# Patient Record
Sex: FEMALE | Race: WHITE
[De-identification: names, ages, dates, MRNs, and addresses within clinical notes are randomized per-mention and may not be internally consistent; named-entity substitution may affect disease eponyms.]

---

## 2017-07-10 ENCOUNTER — HOSPITAL ENCOUNTER (INPATIENT)
Dept: HOSPITAL 74 - JER | Age: 52
LOS: 7 days | Discharge: HOME | DRG: 392 | End: 2017-07-17
Attending: FAMILY MEDICINE | Admitting: FAMILY MEDICINE
Payer: COMMERCIAL

## 2017-07-10 VITALS — BODY MASS INDEX: 37.8 KG/M2

## 2017-07-10 DIAGNOSIS — I34.1: ICD-10-CM

## 2017-07-10 DIAGNOSIS — D64.9: ICD-10-CM

## 2017-07-10 DIAGNOSIS — K57.20: Primary | ICD-10-CM

## 2017-07-10 DIAGNOSIS — I10: ICD-10-CM

## 2017-07-10 DIAGNOSIS — E06.3: ICD-10-CM

## 2017-07-10 DIAGNOSIS — E03.9: ICD-10-CM

## 2017-07-10 DIAGNOSIS — K52.9: ICD-10-CM

## 2017-07-10 DIAGNOSIS — Z71.3: ICD-10-CM

## 2017-07-10 DIAGNOSIS — E66.9: ICD-10-CM

## 2017-07-10 LAB
ALBUMIN SERPL-MCNC: 3.5 G/DL (ref 3.4–5)
ALP SERPL-CCNC: 158 U/L (ref 45–117)
ALT SERPL-CCNC: 53 U/L (ref 12–78)
ANION GAP SERPL CALC-SCNC: 7 MMOL/L (ref 8–16)
AST SERPL-CCNC: 23 U/L (ref 15–37)
BASOPHILS # BLD: 0.3 % (ref 0–2)
BILIRUB SERPL-MCNC: 0.3 MG/DL (ref 0.2–1)
CALCIUM SERPL-MCNC: 9.3 MG/DL (ref 8.5–10.1)
CO2 SERPL-SCNC: 26 MMOL/L (ref 21–32)
CREAT SERPL-MCNC: 0.8 MG/DL (ref 0.55–1.02)
DEPRECATED RDW RBC AUTO: 13.6 % (ref 11.6–15.6)
EOSINOPHIL # BLD: 0.9 % (ref 0–4.5)
GLUCOSE SERPL-MCNC: 100 MG/DL (ref 74–106)
MCH RBC QN AUTO: 28.6 PG (ref 25.7–33.7)
MCHC RBC AUTO-ENTMCNC: 32.9 G/DL (ref 32–36)
MCV RBC: 86.8 FL (ref 80–96)
NEUTROPHILS # BLD: 86.2 % (ref 42.8–82.8)
PLATELET # BLD AUTO: 356 K/MM3 (ref 134–434)
PMV BLD: 7.3 FL (ref 7.5–11.1)
PROT SERPL-MCNC: 6.6 G/DL (ref 6.4–8.2)
WBC # BLD AUTO: 10.1 K/MM3 (ref 4–10)

## 2017-07-10 NOTE — PDOC
History of Present Illness





- General


History Source: Patient, Old Records


Exam Limitations: No Limitations





- History of Present Illness


Initial Comments: 





07/10/17 18:08


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 

hypothyroidim, MVP, s/p cholecystectomy who presents to the ED complaining of 

approximately 5 days of periumbilical pain. Her pain is waxing and waning, 

nonmigrating, ranked 8/10 in intensity. Her pain initially radiated to the back 

but is now localized to the periumbilical region. She states her pain subsided 

over the weekend, but today became worse, prompting her to come to the ED for 

evaluation. No nausea, vomiting, or diarrhea. Bowel movements are normal. No 

fever or chills. No chest pain or shortness of breath. 





PCP: Dr. Melvin Terry


She was scheduled for an abdominal US tomorrow morning. 





<Eryn Souza - Last Filed: 07/10/17 21:03>





<Magaly Kenney - Last Filed: 07/10/17 21:55>





- General


Chief Complaint: Pain, Acute


Stated Complaint: ABD PAIN


Time Seen by Provider: 07/10/17 17:28





Past History





<Eryn Souza - Last Filed: 07/10/17 21:03>





- Past Medical History


Cardiac Disorders: Yes (mvp)


Thyroid Disease: Yes (hypo)





- Surgical History


Cholecystectomy: Yes





- Psycho/Social/Smoking Cessation Hx


Anxiety: No


Suicidal Ideation: No


Smoking History: Never smoked


Have you smoked in the past 12 months: No


Information on smoking cessation initiated: No


Hx Alcohol Use: No


Drug/Substance Use Hx: No


Substance Use Type: None





<Magaly Kenney - Last Filed: 07/10/17 21:55>





- Past Medical History


Allergies/Adverse Reactions: 


 Allergies











Allergy/AdvReac Type Severity Reaction Status Date / Time


 


No Known Allergies Allergy   Verified 07/10/17 17:04











Home Medications: 


Ambulatory Orders





Atenolol [Tenormin -] 25 mg PO DAILY 07/10/17 


Levothyroxine [Synthroid -] 112 mcg PO DAILY 07/10/17 











**Review of Systems





- Review of Systems


Able to Perform ROS?: Yes


Comments:: 





07/10/17 18:20


GENERAL/CONSTITUTIONAL: No fever or chills. No weakness.


HEAD, EYES, EARS, NOSE AND THROAT: No change in vision. No ear pain or 

discharge. No sore throat


CARDIOVASCULAR: No chest pain or shortness of breath.


RESPIRATORY: No cough, wheezing, or hemoptysis.


GASTROINTESTINAL: +abdominal pain. No nausea, vomiting, diarrhea or 

constipation.


GENITOURINARY: No dysuria, frequency, or change in urination.


MUSCULOSKELETAL: No joint or muscle swelling or pain. No neck or back pain.


SKIN: No rash


NEUROLOGIC: No headache, vertigo, loss of consciousness, or change in strength/

sensation.


ENDOCRINE: No increased thirst. No abnormal weight change.


HEMATOLOGIC/LYMPHATIC: No anemia, easy bleeding, or history of blood clots.


ALLERGIC/IMMUNOLOGIC: No hives or skin allergy.








<Eryn Souza - Last Filed: 07/10/17 21:03>





*Physical Exam





- Vital Signs


 Last Vital Signs











Temp Pulse Resp BP Pulse Ox


 


 97.6 F   82   18   159/77   100 


 


 07/10/17 17:04  07/10/17 17:04  07/10/17 17:04  07/10/17 17:04  07/10/17 17:04














- Physical Exam


Comments: 





07/10/17 18:21


GENERAL: Awake, alert, and fully oriented, in no acute distress


HEAD: No signs of trauma


EYES: PERRLA, EOMI, sclera anicteric, conjunctiva clear


ENT: Auricles normal inspection, hearing grossly normal, nares patent, 

oropharynx clear without exudates. Moist mucosa


NECK: Normal ROM, supple, no lymphadenopathy, JVD, or masses


LUNGS: Breath sounds equal, clear to auscultation bilaterally.  No wheezes, and 

no crackles


HEART: Regular rate and rhythm, normal S1 and S2, no murmurs, rubs or gallops


ABDOMEN: +periumbilical and suprapubic tenderness. Soft, normoactive bowel 

sounds.  No guarding, no rebound.  No masses


EXTREMITIES: Normal range of motion, no edema.  No clubbing or cyanosis. No 

cords, erythema, or tenderness


NEUROLOGICAL: Cranial nerves II through XII grossly intact.  Normal speech, 

normal gait


SKIN: Warm, Dry, normal turgor, no rashes or lesions noted.











<Eryn Souza - Last Filed: 07/10/17 21:03>





- Vital Signs


 Last Vital Signs











Temp Pulse Resp BP Pulse Ox


 


 97.6 F   82   18   159/77   100 


 


 07/10/17 17:04  07/10/17 17:04  07/10/17 17:04  07/10/17 17:04  07/10/17 17:04














<Magaly Kenney - Last Filed: 07/10/17 21:55>





ED Treatment Course





- LABORATORY


CBC & Chemistry Diagram: 


 07/10/17 18:06





 07/10/17 18:06





<Eryn Souza - Last Filed: 07/10/17 21:03>





- LABORATORY


CBC & Chemistry Diagram: 


 07/10/17 18:06





 07/10/17 18:06





<Magaly Kenney - Last Filed: 07/10/17 21:55>





Medical Decision Making





- Medical Decision Making





07/10/17 21:03


Case discussed Dr. Castro, who admits for patient's PCP Dr. Terry. Agrees to 

admission and requests Dr. Stoddard for surgical consult. 





Placed call to Dr. Stoddard at [phone]. Awaiting call back. 





<Eryn Souza - Last Filed: 07/10/17 21:03>





- Medical Decision Making





07/10/17 21:47


51-year-old female presents with sudden periumbilical pain that started this 

afternoon.  No diarrhea or vomiting.  No fever or chills.


  Past medical history significant for cholecystectomy  in the remote past.


  On exam, she has periumbilical tenderness and requesting pain meds


-Reviewed and she has a mild leukocytosis





CAT scan, however, was concerning for a distended fluid-filled small bowel 

loops in the upper pelvis associated mesenteric edema


There is several small foci of air noted in the same region.  There is concern 

for acute ischemia versus a localized pneumoperitoneum.  Also alternatively, 

they were concerned about a perforated diverticulosis.  There was minimal 

sigmoid wall thickening, which could be due to acute diverticulitis





The patient was given IV Levaquin and Flagyl, lactic acid was sent





Surgical consultation with Dr. Marco Stoddard requested the patient be placed on 

nothing by mouth, fluid resuscitation





Dr. Castro was informed and requested the patient be sent to the unit.  I spoke 

with the covering NP who accepted the case.














<Magaly Kenney - Last Filed: 07/10/17 21:55>





*DC/Admit/Observation/Transfer





- Attestations


Scribe Attestion: 





07/10/17 18:24





Documentation prepared by Eryn Souza, acting as medical scribe for Magaly Kenney MD.





<Eryn Souza - Last Filed: 07/10/17 21:03>





- Discharge Dispostion


Admit: Yes





<Magaly Kenney - Last Filed: 07/10/17 21:55>


Diagnosis at time of Disposition: 


Abdominal pain


Qualifiers:


 Abdominal location: periumbilical Qualified Code(s): R10.33 - Periumbilical 

pain





- Discharge Dispostion


Condition at time of disposition: Stable





- Referrals


Referrals: 


Melvin Terry MD [Primary Care Provider] -

## 2017-07-10 NOTE — CONSULT
Consult


Consult Specialty:: Pulm/CCM


Reason for Consultation:: Acute periumbilical abd pain





- History of Present Illness


Chief Complaint: Acute abd pain


History of Present Illness: 


51yow with PMHx of HTN, hypothyroidim, mitral valve prolapse, s/p 

cholecystectomy in distant past who presented to the ED c/satya approximately 5 

days of waxing and waning periumbilical pain. As per previous note the pain 

initially radiated to the back but is now localized to the periumbilical 

region. She states her pain subsided over the weekend, but today became worse, 

prompting her to come to the ED for evaluation. She denied fever, chills, nausea

, vomiting, or diarrhea, chest pain or SOB. Had BM x1 loose non bloody today.  

Labs notable for WBC 10, alk phos 158, Lipase 65 and Lact 1.3. CT scan abd/

pelvis significant for distended fluid filled small bowel loops with associated 

mesentary edema and small foci of air c/facute ischemia versus a localized 

pneumoperitoneum. She was started on flagyl. Surgery Dr Stoddard was consulted. She 

was transferred to ICU for further management.





Rec'd in ICU A+O x3 T 99, /76, HR 91, O2 sat 99% on room air with c/o 8/

10 periumbilical pain which radiates laterally. Abd soft with some guarding. no 

rebound tenderness. No periumbilical discoloration noted. Kept NPO with NS IVF. 





- History Source


History Provided By: Patient


Limitations to Obtaining History: No Limitations





- Past Medical History


Cardio/Vascular: Yes: Other (Mitral valve prolapse)


...LMP Comment: about 1 year ago


...Pregnant: No (menopause)


Endocrine: Yes: Hypothyroidism





- Past Surgical History


Past Surgical History: Yes: Cholecystectomy, Tubal Ligation





- Alcohol/Substance Use


Hx Alcohol Use: No





- Smoking History


Smoking history: Never smoked


Have you smoked in the past 12 months: No





- Social History


Usual Living Arrangement: With Spouse


ADL: Independent





Home Medications





- Allergies


Allergies/Adverse Reactions: 


 Allergies











Allergy/AdvReac Type Severity Reaction Status Date / Time


 


No Known Allergies Allergy   Verified 07/10/17 17:04














- Home Medications


Home Medications: 


Ambulatory Orders





Atenolol [Tenormin -] 25 mg PO DAILY 07/10/17 


Levothyroxine [Synthroid -] 112 mcg PO DAILY 07/10/17 











Family Disease History





- Family Disease History


Family History: Unremarkable





Review of Systems





- Review of Systems


Constitutional: reports: No Symptoms


Eyes: reports: No Symptoms


HENT: reports: No Symptoms


Neck: reports: No Symptoms


Cardiovascular: reports: No Symptoms


Respiratory: reports: No Symptoms


Gastrointestinal: reports: Abdominal Pain


Genitourinary: reports: No Symptoms


Neurological: reports: No Symptoms


Endocrine: reports: No Symptoms


Hematology/Lymphatic: reports: No Symptoms


Psychiatric: reports: No Symptoms


Pain Intensity: 8





Physical Exam


Vital Signs: 


 Vital Signs











Temperature  99 F   07/10/17 23:20


 


Pulse Rate  96 H  07/10/17 23:20


 


Respiratory Rate  26 H  07/10/17 23:20


 


Blood Pressure  136/89   07/10/17 23:20


 


O2 Sat by Pulse Oximetry (%)  98   07/10/17 23:20











Constitutional: Yes: Obese


Eyes: Yes: WNL


HENT: Yes: Normocephalic


Neck: Yes: WNL


Cardiovascular: Yes: Regular Rate and Rhythm


Respiratory: Yes: Regular, CTA Bilaterally


Gastrointestinal: Yes: Soft, Abdomen, Obese


Edema: No


Peripheral Pulses WNL: Yes


Neurological: Yes: WNL, Alert, Oriented


...Motor Strength: WNL


Psychiatric: Yes: WNL


Labs: 


CBC,CMP











WBC  10.1 K/mm3 (4.0-10.0)  H D 07/10/17  18:06    


 


RBC  4.31 M/mm3 (3.60-5.2)   07/10/17  18:06    


 


Hgb  12.3 GM/dL (10.7-15.3)  D 07/10/17  18:06    


 


Hct  37.4 % (32.4-45.2)   07/10/17  18:06    


 


MCV  86.8 fl (80-96)   07/10/17  18:06    


 


MCH  28.6 pg (25.7-33.7)   07/10/17  18:06    


 


MCHC  32.9 g/dl (32.0-36.0)   07/10/17  18:06    


 


RDW  13.6 % (11.6-15.6)   07/10/17  18:06    


 


Plt Count  356 K/MM3 (134-434)  D 07/10/17  18:06    


 


MPV  7.3 fl (7.5-11.1)  L  07/10/17  18:06    


 


Neutrophils %  86.2 % (42.8-82.8)  H D 07/10/17  18:06    


 


Lymphocytes %  9.8 % (8-40)  D 07/10/17  18:06    


 


Monocytes %  2.8 % (3.8-10.2)  L  07/10/17  18:06    


 


Eosinophils %  0.9 % (0-4.5)   07/10/17  18:06    


 


Basophils %  0.3 % (0-2.0)   07/10/17  18:06    


 


ESR  10 mm/hr (0-30)   07/10/17  22:07    


 


Sodium  140 mmol/L (136-145)   07/10/17  18:06    


 


Potassium  4.3 mmol/L (3.5-5.1)   07/10/17  18:06    


 


Chloride  107 mmol/L ()   07/10/17  18:06    


 


Carbon Dioxide  26 mmol/L (21-32)   07/10/17  18:06    


 


Anion Gap  7  (8-16)  L  07/10/17  18:06    


 


BUN  15 mg/dL (7-18)   07/10/17  18:06    


 


Creatinine  0.8 mg/dL (0.55-1.02)   07/10/17  18:06    


 


Creat Clearance w eGFR  > 60  (>60)   07/10/17  18:06    


 


Random Glucose  100 mg/dL ()   07/10/17  18:06    


 


Lactic Acid  1.3 mmol/L (0.4-2.0)   07/10/17  21:35    


 


Calcium  9.3 mg/dL (8.5-10.1)   07/10/17  18:06    


 


Total Bilirubin  0.3 mg/dL (0.2-1.0)  D 07/10/17  18:06    


 


AST  23 U/L (15-37)  D 07/10/17  18:06    


 


ALT  53 U/L (12-78)  D 07/10/17  18:06    


 


Alkaline Phosphatase  158 U/L ()  H D 07/10/17  18:06    


 


Total Protein  6.6 g/dl (6.4-8.2)   07/10/17  18:06    


 


Albumin  3.5 g/dl (3.4-5.0)   07/10/17  18:06    


 


Lipase  65 U/L ()  L  07/10/17  18:06    


 


Serum Pregnancy, Qual  Negative   07/10/17  20:08    














Imaging





- Results


Chest X-ray: Image Reviewed (clear in all fields)


Cat Scan: Report Reviewed (? pneumoperitoneum; diverticulosis and colonic wall 

thickening)





Problem List





- Problems


(1) Abdominal pain


Code(s): R10.9 - UNSPECIFIED ABDOMINAL PAIN   Qualifiers: 


     Abdominal location: periumbilical     Qualified Code(s): R10.33 - 

Periumbilical pain  








Assessment/Plan


51yow with PMHx of HTN, hypothyroidim, mitral valve prolapse, s/p 

cholecystectomy in distant past who presented to the ED c/o approximately 5 

days of waxing and waning periumbilical pain. As per previous note the pain 

initially radiated to the back but is now localized to the periumbilical 

region. Unclear intrabdominal process with c/f pancreatitis vs mesenteric 

ischemia vs diverticulosis. Surgery consulted.





Plan:


-Maintain NPO with IVF


-Pain management with dilaudid


-Continue empiric antibiotics


-trend CBC and lactate

## 2017-07-10 NOTE — PDOC
History of Present Illness





- General


Chief Complaint: Pain, Acute


Stated Complaint: ABD PAIN


Time Seen by Provider: 07/10/17 17:28





Past History





- Past Medical History


Allergies/Adverse Reactions: 


 Allergies











Allergy/AdvReac Type Severity Reaction Status Date / Time


 


No Known Allergies Allergy   Verified 07/10/17 17:04











Cardiac Disorders: Yes (mvp)


Thyroid Disease: Yes (hypo)





- Surgical History


Cholecystectomy: Yes





- Psycho/Social/Smoking Cessation Hx


Anxiety: No


Suicidal Ideation: No


Smoking History: Never smoked


Have you smoked in the past 12 months: No


Information on smoking cessation initiated: No


Hx Alcohol Use: No


Drug/Substance Use Hx: No


Substance Use Type: None





*Physical Exam





- Vital Signs


 Last Vital Signs











Temp Pulse Resp BP Pulse Ox


 


 97.6 F   82   18   159/77   100 


 


 07/10/17 17:04  07/10/17 17:04  07/10/17 17:04  07/10/17 17:04  07/10/17 17:04

## 2017-07-11 LAB
ANION GAP SERPL CALC-SCNC: 8 MMOL/L (ref 8–16)
APPEARANCE UR: CLEAR
BASOPHILS # BLD: 0.4 % (ref 0–2)
BILIRUB UR STRIP.AUTO-MCNC: NEGATIVE MG/DL
CALCIUM SERPL-MCNC: 8.2 MG/DL (ref 8.5–10.1)
CO2 SERPL-SCNC: 28 MMOL/L (ref 21–32)
COLOR UR: YELLOW
CREAT SERPL-MCNC: 0.6 MG/DL (ref 0.55–1.02)
DEPRECATED RDW RBC AUTO: 13.6 % (ref 11.6–15.6)
EOSINOPHIL # BLD: 0.3 % (ref 0–4.5)
GLUCOSE SERPL-MCNC: 110 MG/DL (ref 74–106)
KETONES UR QL STRIP: NEGATIVE
LEUKOCYTE ESTERASE UR QL STRIP.AUTO: NEGATIVE
MCH RBC QN AUTO: 29.1 PG (ref 25.7–33.7)
MCHC RBC AUTO-ENTMCNC: 33.4 G/DL (ref 32–36)
MCV RBC: 87.1 FL (ref 80–96)
NEUTROPHILS # BLD: 85 % (ref 42.8–82.8)
NITRITE UR QL STRIP: NEGATIVE
PH UR: 6 [PH] (ref 5–8)
PLATELET # BLD AUTO: 269 K/MM3 (ref 134–434)
PMV BLD: 6.8 FL (ref 7.5–11.1)
PROT UR QL STRIP: NEGATIVE
PROT UR QL STRIP: NEGATIVE
RBC # UR STRIP: NEGATIVE /UL
SP GR UR: 1.02 (ref 1–1.02)
UROBILINOGEN UR STRIP-MCNC: NEGATIVE E.U./DL (ref 0.2–1)
WBC # BLD AUTO: 10.9 K/MM3 (ref 4–10)

## 2017-07-11 RX ADMIN — ONDANSETRON PRN MG: 2 INJECTION INTRAMUSCULAR; INTRAVENOUS at 18:06

## 2017-07-11 RX ADMIN — METRONIDAZOLE SCH MLS/HR: 500 INJECTION, SOLUTION INTRAVENOUS at 09:32

## 2017-07-11 RX ADMIN — ACETAMINOPHEN PRN MG: 10 INJECTION, SOLUTION INTRAVENOUS at 03:30

## 2017-07-11 RX ADMIN — ONDANSETRON PRN MG: 2 INJECTION INTRAMUSCULAR; INTRAVENOUS at 03:23

## 2017-07-11 RX ADMIN — SODIUM CHLORIDE SCH MLS/HR: 9 INJECTION, SOLUTION INTRAVENOUS at 09:00

## 2017-07-11 RX ADMIN — ACETAMINOPHEN PRN MG: 10 INJECTION, SOLUTION INTRAVENOUS at 09:28

## 2017-07-11 RX ADMIN — SODIUM CHLORIDE SCH MLS/HR: 9 INJECTION, SOLUTION INTRAVENOUS at 21:54

## 2017-07-11 RX ADMIN — ACETAMINOPHEN PRN MG: 10 INJECTION, SOLUTION INTRAVENOUS at 17:03

## 2017-07-11 RX ADMIN — PANTOPRAZOLE SODIUM SCH MG: 40 INJECTION, POWDER, FOR SOLUTION INTRAVENOUS at 11:16

## 2017-07-11 RX ADMIN — LEVOFLOXACIN SCH MLS/HR: 5 INJECTION, SOLUTION INTRAVENOUS at 09:36

## 2017-07-11 RX ADMIN — METRONIDAZOLE SCH MLS/HR: 500 INJECTION, SOLUTION INTRAVENOUS at 17:04

## 2017-07-11 RX ADMIN — ENOXAPARIN SODIUM SCH MG: 40 INJECTION SUBCUTANEOUS at 12:09

## 2017-07-11 RX ADMIN — ONDANSETRON PRN MG: 2 INJECTION INTRAMUSCULAR; INTRAVENOUS at 13:08

## 2017-07-11 NOTE — PN
Teaching Attending Note


Name of Resident: Blas Wagner


ATTENDING PHYSICIAN STATEMENT





I saw and evaluated the patient.


I reviewed the resident's note and discussed the case with the resident.


I agree with the resident's findings and plan as documented.








SUBJECTIVE:





Pt seen and examined in the ICU. States abdominal pain is improving. No fevers 

but with chills overnight. No nausea, vomiting.





OBJECTIVE:





 Last Vital Signs











Temp Pulse Resp BP Pulse Ox


 


 98.0 F   76   18   112/70   99 


 


 07/11/17 10:00  07/11/17 10:00  07/11/17 10:00  07/11/17 10:00  07/11/17 09:00








 Intake & Output











 07/08/17 07/09/17 07/10/17 07/11/17





 23:59 23:59 23:59 23:59


 


Intake Total    950


 


Balance    950


 


Weight   245 lb 2.464 oz 245 lb 2.464 oz








Gen:  NAD at rest


Heart:  RRR


Lung: right base rales


Abd: soft, mild TTP periumbilical region, no rebound or guarding


Ext: no edema





 CBC, BMP





 07/11/17 08:35 





 07/11/17 08:35 





Active Medications





Acetaminophen (Ofirmev Injection -)  1,000 mg IVPB Q6H PRN


   PRN Reason: FEVER OR PAIN


   Stop: 07/11/17 16:00


   Last Admin: 07/11/17 09:28 Dose:  1,000 mg


Enoxaparin Sodium (Lovenox -)  40 mg SQ DAILY GARRY


Hydromorphone HCl (Dilaudid Injection -)  1 mg IVPB Q4H PRN


   PRN Reason: PAIN


   Last Admin: 07/11/17 08:14 Dose:  1 mg


Metronidazole (Flagyl 500mg Premixed Ivpb -)  100 mls @ 100 mls/hr IVPB Q8H-IV 

GARRY


   Last Admin: 07/11/17 09:32 Dose:  100 mls/hr


Levofloxacin (Levaquin 500 Mg Premixed Ivpb -)  100 mls @ 100 mls/hr IVPB DAILY 

GARRY


   Last Admin: 07/11/17 09:36 Dose:  100 mls/hr


Sodium Chloride (Normal Saline -)  1,000 mls @ 150 mls/hr IV ASDIR GARRY


   Last Admin: 07/11/17 09:00 Dose:  150 mls/hr


Metoprolol Tartrate (Lopressor Injection -)  5 mg IVPUSH Q4H PRN


   PRN Reason: HYPERTENSION


Ondansetron HCl (Zofran Injection)  4 mg IVPB Q4H PRN


   PRN Reason: NAUSEA AND/OR VOMITING


   Last Admin: 07/11/17 03:23 Dose:  4 mg


Pantoprazole Sodium (Protonix 40mg Ivpb (Pre-Docked))  40 mg IVPB DAILY GARRY


   Last Admin: 07/11/17 11:16 Dose:  40 mg








ASSESSMENT AND PLAN:


Abdominal Pain


r/o Diverticulitis


HTN


Hypothyroidism


Mitral Valve Prolapse





-  continue empiric antibiotics


-  f/u cultures


-  serial abdominal exams


-  pain control


-  incentive spirometry


-  NPO


-  IVF


-  surgery f/u


-  DVT prophylaxis

## 2017-07-11 NOTE — PN
Teaching Attending Note


Name of Resident: Leslie Sahni


ATTENDING PHYSICIAN STATEMENT





I saw and evaluated the patient.


I reviewed the resident's note and discussed the case with the resident.


I agree with the resident's findings and plan as documented.








SUBJECTIVE:


Chart reviewed, pt seen


CT reviewed with radiologist








OBJECTIVE:


Awake, alert


No acute distress


Afebrile


Cor S1S2


Lungs clear


abdomen hypoactive BS  soft, mild periumbilical tenderness





ASSESSMENT AND PLAN:


Possible perforated diverticulum  colonic v. Meckel's


Possible ileus v.  SBO


Await c/s


Continue empiric levaquin/ flagyl

## 2017-07-11 NOTE — EKG
Test Reason : 

Blood Pressure : ***/*** mmHG

Vent. Rate : 100 BPM     Atrial Rate : 100 BPM

   P-R Int : 158 ms          QRS Dur : 082 ms

    QT Int : 342 ms       P-R-T Axes : 030 053 012 degrees

   QTc Int : 441 ms

 

NORMAL SINUS RHYTHM

INTRAATRIAL CONDUCTION DELAY

WHEN COMPARED WITH ECG OF 26-JAN-2010 13:33,

NO SIGNIFICANT CHANGE WAS FOUND

Confirmed by MARNIE RYAN MD ([id number]) on 7/11/2017 5:23:51 PM

 

Referred By:             Confirmed By:MARNIE RYAN MD

## 2017-07-11 NOTE — PN
Physical Exam: 


SUBJECTIVE: Patient seen and examined at bedside in ICU. Pt is a 52 y/o F with 

PMH of HTN, hypothyroidism presented to ER with 10/10 mid abdominal pain with 

sudden onset. She had one episode of non-bloody loose stool yesterday. Denied 

any N/V/F/C, change in diet, recent travel, sick contacts.


Currently feels better with pain rated as a 4-5/10. She has never had this type 

of pain before and denies any trauma to the area. Surgical hx includes C-

section x1, and cholecystectomy 20+ years ago.








OBJECTIVE:





 


 Vital Signs











Temperature  98.0 F   07/11/17 10:00


 


Pulse Rate  77   07/11/17 12:00


 


Respiratory Rate  17   07/11/17 12:00


 


Blood Pressure  124/77   07/11/17 12:00


 


O2 Sat by Pulse Oximetry (%)  99   07/11/17 09:00














GENERAL: The patient is awake, alert, and fully oriented, in no acute distress.


HEAD: Normal with no signs of trauma.


EYES: extraocular movements intact, sclera anicteric, conjunctiva clear. No 

ptosis. 


ENT: Ears normal, nares patent


NECK: Trachea midline 


LUNGS: Breath sounds equal, clear to auscultation bilaterally, no wheezes


HEART: Regular rate and rhythm, S1, S2 without murmur, rub or gallop.


ABDOMEN: +periumbilical tenderness, LLQ tenderness, RLQ tenderness, Soft, 

nondistended, normoactive bowel sounds, no guarding, no 


rebound


EXTREMITIES: warm, well-perfused 


NEUROLOGICAL: Cranial nerves II through XII grossly intact. Normal speech, gait 

not observed.


PSYCH: Normal mood, normal affect.


SKIN: Warm, dry











 Laboratory Results - last 24 hr











  07/10/17 07/11/17 07/11/17





  22:07 08:35 08:35


 


WBC   10.9 H 


 


RBC   3.84 


 


Hgb   11.2 


 


Hct   33.5 


 


MCV   87.1 


 


MCH   29.1 


 


MCHC   33.4 


 


RDW   13.6 


 


Plt Count   269  D 


 


MPV   6.8 L 


 


Neutrophils %   85.0 H 


 


Lymphocytes %   10.7 


 


Monocytes %   3.6 L 


 


Eosinophils %   0.3 


 


Basophils %   0.4 


 


ESR  10  


 


Sodium    141


 


Potassium    4.2


 


Chloride    105


 


Carbon Dioxide    28


 


Anion Gap    8


 


BUN    8  D


 


Creatinine    0.6  D


 


Random Glucose    110 H


 


Calcium    8.2 L











Imaging:


CT abd/pelvis: IMPRESSION: Mildly distended fluid-filled small bowel loops are 

seen within the upper pelvis centrally with associated mesenteric edema. 

Several small foci of air are noted in the same region. It is uncertain whether 

these small foci of abnormal air accumulation are on the basis of intramural 

air and therefore due to acute ischemia versus representing a localized 

pneumoperitoneum. The middle third of the sigmoid colon abuts this region. 

Several diverticula are noted within the sigmoid colon and therefore 

alternatively the previously described findings could be on the basis of 

perforated diverticulosis. There is also minimal sigmoid wall thickening which 

could be due to acute diverticulitis. The previously described mild fluid-

filled small bowel distention may be on the basis of an ileus versus mild small 

bowel obstruction. A small amount of free fluid is seen within the pelvic cul-de

-sac and right upper quadrant. Mild splenomegaly. Status post cholecystectomy.








Active Medications











Generic Name Dose Route Start Last Admin





  Trade Name Freq  PRN Reason Stop Dose Admin


 


Acetaminophen  1,000 mg 07/10/17 21:59 07/11/17 09:28





  Ofirmev Injection -  IVPB 07/11/17 16:00  1,000 mg





  Q6H PRN   Administration





  FEVER OR PAIN   


 


Enoxaparin Sodium  40 mg 07/11/17 11:15 07/11/17 12:09





  Lovenox -  SQ   40 mg





  DAILY GARRY   Administration


 


Hydromorphone HCl  1 mg 07/11/17 03:09 07/11/17 08:14





  Dilaudid Injection -  IVPB   1 mg





  Q4H PRN   Administration





  PAIN   


 


Metronidazole  100 mls @ 100 mls/hr 07/11/17 10:00 07/11/17 09:32





  Flagyl 500mg Premixed Ivpb -  IVPB   100 mls/hr





  Q8H-IV GARRY   Administration


 


Levofloxacin  100 mls @ 100 mls/hr 07/11/17 10:00 07/11/17 09:36





  Levaquin 500 Mg Premixed Ivpb -  IVPB   100 mls/hr





  DAILY GARRY   Administration


 


Sodium Chloride  1,000 mls @ 150 mls/hr 07/11/17 08:15 07/11/17 09:00





  Normal Saline -  IV   150 mls/hr





  ASDIR GARRY   Administration


 


Metoprolol Tartrate  5 mg 07/11/17 03:09  





  Lopressor Injection -  IVPUSH   





  Q4H PRN   





  HYPERTENSION   


 


Ondansetron HCl  4 mg 07/11/17 03:09 07/11/17 03:23





  Zofran Injection  IVPB   4 mg





  Q4H PRN   Administration





  NAUSEA AND/OR VOMITING   


 


Pantoprazole Sodium  40 mg 07/11/17 10:00 07/11/17 11:16





  Protonix 40mg Ivpb (Pre-Docked)  IVPB   40 mg





  DAILY GARRY   Administration











ASSESSMENT/PLAN:


52 y/o F with PMH of HTN, hypothyroidism presented to ER with 10/10 mid 

abdominal pain with sudden onset. Admitted to ICU w/possible etiologies include 

mesenteric ischemia, diverticulitis, enteritis, infectious/inflammatory colitis 

on CT.





Neuro:


Mental status intact





Abd:


Abdominal pain secondary to possibly mesenteric ischemia, diverticulitis, 

enteritis, infectious/inflammatory colitis


   -Surgery on board, ID on board


   -f/u C. Diff, stool cultures


   -c/w levaquin and flagyl


   -pain control w/dilaudid 1mg IVPB q4h PRN for pain and Ofirmev 1g IV q6h PRN


   -NS @ 150 ml/hr


   -Protonix 40 mg IVPB qd





Endocrine


Hypothyroidism


   -c/w synthroid 150 mcg po qd once able to tolerate PO





Cardio


HTN


   -c/w lopressor 5 mg IV q4h prn for htn





Prophylaxis


   -DVT: lovenox 40 mg sq qd


   -GI: Protonix 40 mg IV qd





Dispo:


continue to monitor in ICU





Problem List





- Problems


(1) Abdominal pain


Code(s): R10.9 - UNSPECIFIED ABDOMINAL PAIN   Qualifiers: 


     Abdominal location: periumbilical     Qualified Code(s): R10.33 - 

Periumbilical pain  





(2) Acute diverticulitis


Code(s): K57.92 - DVTRCLI OF INTEST, PART UNSP, W/O PERF OR ABSCESS W/O BLEED





(3) Colitis


Code(s): K52.9 - NONINFECTIVE GASTROENTERITIS AND COLITIS, UNSPECIFIED





(4) Other specified hypothyroidism


Code(s): E03.8 - OTHER SPECIFIED HYPOTHYROIDISM








Visit type





- Emergency Visit


Emergency Visit: Yes


ED Registration Date: 07/10/17


Care time: The patient presented to the Emergency Department on the above date 

and was hospitalized for further evaluation of their emergent condition.





- New Patient


This patient is new to me today: Yes


Date on this admission: 07/11/17





- Critical Care


Critical Care patient: Yes


Total Critical Care Time (in minutes): 35


Critical Care Statement: The care of this patient involved high complexity 

decision making to prevent further life threatening deterioration of the patient

's condition and/or to evalute & treat vital organ system(s) failure or risk of 

failure.

## 2017-07-11 NOTE — CONSULT
Consult


Consult Specialty:: endocrine


Reason for Consultation:: hypothyroidism





- History of Present Illness


Chief Complaint: abdominal pain


History of Present Illness: 


51yow with PMHx of HTN, hypothyroidim, mitral valve prolapse, s/p 

cholecystectomy in distant past who presented to the ED c/satya approximately 5 

days of waxing and waning periumbilical pain.has weakness,nausea pain severe 

requiring pain medication 





- Past Medical History


Cardio/Vascular: Yes: Other (Mitral valve prolapse)


Gastrointestinal: Yes: Other (Occassioanl acid reflux, relieved by position)


...LMP Comment: about 1 year ago


...Pregnant: No (menopause)


Endocrine: Yes: Hypothyroidism





- Past Surgical History


Past Surgical History: Yes: Cholecystectomy (Had gall bladder removal about 23 

years ago), Tubal Ligation





- Alcohol/Substance Use


Hx Alcohol Use: No





- Smoking History


Smoking history: Never smoked


Have you smoked in the past 12 months: No





- Social History


Usual Living Arrangement: With Spouse


ADL: Independent





Home Medications





- Allergies


Allergies/Adverse Reactions: 


 Allergies











Allergy/AdvReac Type Severity Reaction Status Date / Time


 


No Known Allergies Allergy   Verified 07/10/17 17:04














- Home Medications


Home Medications: 


Ambulatory Orders





Atenolol [Tenormin -] 25 mg PO DAILY 07/10/17 


Levothyroxine [Synthroid -] 112 mcg PO DAILY 07/10/17 











Family Disease History





- Family Disease History


Family Disease History: Heart Disease: Father (HTN, Afib, Alzheimers,  4 

years ago), Mother (diverticulosis, COPD,Pulm HTN  6 months ago), 

Respiratory: Mother, Other: Father, Mother





Review of Systems





- Review of Systems


Constitutional: reports: Lethargy, Malaise


Eyes: reports: No Symptoms


HENT: reports: No Symptoms


Neck: reports: No Symptoms


Cardiovascular: reports: No Symptoms


Respiratory: reports: Exercise Intolerance, SOB


Genitourinary: reports: No Symptoms


Breasts: reports: No Symptoms Reported


Musculoskeletal: reports: No Symptoms


Endocrine: reports: No Symptoms


Hematology/Lymphatic: reports: No Symptoms


Psychiatric: reports: No Symptoms





Physical Exam


Vital Signs: 


 Vital Signs











Temperature  98.0 F   17 10:00


 


Pulse Rate  77   17 12:00


 


Respiratory Rate  17   17 12:00


 


Blood Pressure  124/77   17 12:00


 


O2 Sat by Pulse Oximetry (%)  99   17 09:00











Constitutional: Yes: Anxious


Eyes: Yes: EOM Intact


HENT: Yes: Normocephalic


Neck: Yes: Trachea Midline, Thyromegaly


Cardiovascular: Yes: Regular Rate and Rhythm


Respiratory: Yes: CTA Bilaterally


Gastrointestinal: Yes: Hypoactive Bowel Sounds, Tenderness, Epigastrium


...Rectal Exam: Yes: Deferred


Renal/: Yes: WNL


Breast(s): Yes: WNL


Musculoskeletal: Yes: WNL


Extremities: Yes: WNL


Peripheral Pulses WNL: No


...Motor Strength: WNL


Psychiatric: Yes: Alert, Oriented


Labs: 


 CBC, BMP





 17 08:35 





 17 08:35 











Problem List





- Problems


(1) Abdominal pain


Code(s): R10.9 - UNSPECIFIED ABDOMINAL PAIN   Qualifiers: 


     Abdominal location: periumbilical     Qualified Code(s): R10.33 - 

Periumbilical pain  





(2) Acute diverticulitis


Code(s): K57.92 - DVTRCLI OF INTEST, PART UNSP, W/O PERF OR ABSCESS W/O BLEED





(3) Colitis


Code(s): K52.9 - NONINFECTIVE GASTROENTERITIS AND COLITIS, UNSPECIFIED





(4) Other specified hypothyroidism


Code(s): E03.8 - OTHER SPECIFIED HYPOTHYROIDISM








Assessment/Plan


Current Active Problems





Abdominal pain (Acute) 


Acute diverticulitis (Acute) 


Colitis (Acute) 


Status post cholecystectomy (Acute) 





hyothyridism hasimoto


 Abnormal Lab Results











  07/10/17 07/10/17 07/10/17





  18:06 18:06 18:06


 


WBC  10.1 H D  


 


MPV  7.3 L  


 


Neutrophils %  86.2 H D  


 


Monocytes %  2.8 L  


 


Anion Gap   7 L 


 


Random Glucose   


 


Calcium   


 


Alkaline Phosphatase   158 H D 


 


Lipase    65 L














  17





  08:35 08:35


 


WBC  10.9 H 


 


MPV  6.8 L 


 


Neutrophils %  85.0 H 


 


Monocytes %  3.6 L 


 


Anion Gap  


 


Random Glucose   110 H


 


Calcium   8.2 L


 


Alkaline Phosphatase  


 


Lipase  








 Laboratory Results - last 24 hr











  07/10/17 07/10/17 07/10/17





  18:06 18:06 18:06


 


WBC  10.1 H D  


 


RBC  4.31  


 


Hgb  12.3  D  


 


Hct  37.4  


 


MCV  86.8  


 


MCH  28.6  


 


MCHC  32.9  


 


RDW  13.6  


 


Plt Count  356  D  


 


MPV  7.3 L  


 


Neutrophils %  86.2 H D  


 


Lymphocytes %  9.8  D  


 


Monocytes %  2.8 L  


 


Eosinophils %  0.9  


 


Basophils %  0.3  


 


ESR   


 


Sodium   140 


 


Potassium   4.3 


 


Chloride   107 


 


Carbon Dioxide   26 


 


Anion Gap   7 L 


 


BUN   15 


 


Creatinine   0.8 


 


Creat Clearance w eGFR   > 60 


 


Random Glucose   100 


 


Lactic Acid   


 


Calcium   9.3 


 


Total Bilirubin   0.3  D 


 


AST   23  D 


 


ALT   53  D 


 


Alkaline Phosphatase   158 H D 


 


Total Protein   6.6 


 


Albumin   3.5 


 


Lipase    65 L


 


Serum Pregnancy, Qual   














  07/10/17 07/10/17 07/10/17





  20:08 21:35 22:07


 


WBC   


 


RBC   


 


Hgb   


 


Hct   


 


MCV   


 


MCH   


 


MCHC   


 


RDW   


 


Plt Count   


 


MPV   


 


Neutrophils %   


 


Lymphocytes %   


 


Monocytes %   


 


Eosinophils %   


 


Basophils %   


 


ESR    10


 


Sodium   


 


Potassium   


 


Chloride   


 


Carbon Dioxide   


 


Anion Gap   


 


BUN   


 


Creatinine   


 


Creat Clearance w eGFR   


 


Random Glucose   


 


Lactic Acid   1.3 


 


Calcium   


 


Total Bilirubin   


 


AST   


 


ALT   


 


Alkaline Phosphatase   


 


Total Protein   


 


Albumin   


 


Lipase   


 


Serum Pregnancy, Qual  Negative  














  17





  08:35 08:35


 


WBC  10.9 H 


 


RBC  3.84 


 


Hgb  11.2 


 


Hct  33.5 


 


MCV  87.1 


 


MCH  29.1 


 


MCHC  33.4 


 


RDW  13.6 


 


Plt Count  269  D 


 


MPV  6.8 L 


 


Neutrophils %  85.0 H 


 


Lymphocytes %  10.7 


 


Monocytes %  3.6 L 


 


Eosinophils %  0.3 


 


Basophils %  0.4 


 


ESR  


 


Sodium   141


 


Potassium   4.2


 


Chloride   105


 


Carbon Dioxide   28


 


Anion Gap   8


 


BUN   8  D


 


Creatinine   0.6  D


 


Creat Clearance w eGFR  


 


Random Glucose   110 H


 


Lactic Acid  


 


Calcium   8.2 L


 


Total Bilirubin  


 


AST  


 


ALT  


 


Alkaline Phosphatase  


 


Total Protein  


 


Albumin  


 


Lipase  


 


Serum Pregnancy, Qual  








plan;


chk tsh free t4


 Current Medications











Generic Name Dose Route Start Last Admin





  Trade Name Freq  PRN Reason Stop Dose Admin


 


Acetaminophen  1,000 mg 07/10/17 21:59 17 09:28





  Ofirmev Injection -  IVPB 17 16:00  1,000 mg





  Q6H PRN   Administration





  FEVER OR PAIN   


 


Enoxaparin Sodium  40 mg 17 11:15 17 12:09





  Lovenox -  SQ   40 mg





  DAILY GARRY   Administration


 


Hydromorphone HCl  1 mg 17 03:09 17 08:14





  Dilaudid Injection -  IVPB   1 mg





  Q4H PRN   Administration





  PAIN   


 


Metronidazole  100 mls @ 100 mls/hr 17 10:00 17 09:32





  Flagyl 500mg Premixed Ivpb -  IVPB   100 mls/hr





  Q8H-IV GARRY   Administration


 


Levofloxacin  100 mls @ 100 mls/hr 17 10:00 17 09:36





  Levaquin 500 Mg Premixed Ivpb -  IVPB   100 mls/hr





  DAILY GARRY   Administration


 


Sodium Chloride  1,000 mls @ 150 mls/hr 17 08:15 17 09:00





  Normal Saline -  IV   150 mls/hr





  ASDIR GARRY   Administration


 


Metoprolol Tartrate  5 mg 17 03:09  





  Lopressor Injection -  IVPUSH   





  Q4H PRN   





  HYPERTENSION   


 


Ondansetron HCl  4 mg 17 03:09 17 03:23





  Zofran Injection  IVPB   4 mg





  Q4H PRN   Administration





  NAUSEA AND/OR VOMITING   


 


Pantoprazole Sodium  40 mg 17 10:00 17 11:16





  Protonix 40mg Ivpb (Pre-Docked)  IVPB   40 mg





  DAILY GARRY   Administration








once able to take synthroid resume po synthroid 125mcg

## 2017-07-11 NOTE — CONSULT
Consult


Consult Specialty:: Infectious disease


Reason for Consultation:: distended fluid filled small bowel loops with 

associated mesentary edema and small foci of air c/facute ischemia versus a 

localized pneumoperitoneum





- History of Present Illness


Chief Complaint: Periumbilical pain X4 days


History of Present Illness: 


The patient is a 51 year old F S/P cholecystectomy,hiatal hernia repair over 20 

years ago presenting with a 4 day history of periumbilical pain. Started on IV 

flagyl yesterday for differential diagnosis of mesenteric ischemia, 

diverticulitis, enteritis, infectious/inflammatory colitis. Pain was sharp in 

nature, radiating to the back, relieved  for two days after using a lidocaine 

patch overnight on Thursday. There was no associated fever but she had chills, 

no nausea or vomiting. No constipation or diarrhea. Yesterday, the pain resumed 

suddenly, at the periumbilical region and was a 10/10 not relieved by change in 

position. The pain was not relieved by passing urine or stool. She had one 

episode of loose stools, non bloody, no mucus, which was greenish in color (had 

eaten vegetables) just prior to onset of the pain yesterday. The pain was 

colicky in nature and radiated to the suprapubic region, right and left 

hypochondrial regions, and was associated with supraumbilical swelling. No 

dysuria, or flank pain, or hematuria, no vaginal discharge. One hour after the 

onset of the pain yesterday, she presented in the emergency department with a 10

/10 and was given IV Fluids, flagyl and levofloxacin and is on NPO. Her pain is 

currently 6/10. 





- History Source


History Provided By: Patient


Limitations to Obtaining History: No Limitations





- Past Medical History


Cardio/Vascular: Yes: Other (Mitral valve prolapse)


Gastrointestinal: Yes: Other (Occassioanl acid reflux, relieved by position)


...LMP Comment: about 1 year ago


...Pregnant: No (menopause)


Endocrine: Yes: Hypothyroidism





- Past Surgical History


Past Surgical History: Yes: Cholecystectomy (Had gall bladder removal about 23 

years ago), Tubal Ligation





- Alcohol/Substance Use


Hx Alcohol Use: No





- Smoking History


Smoking history: Never smoked


Have you smoked in the past 12 months: No





- Social History


Usual Living Arrangement: With Spouse


ADL: Independent





Home Medications





- Allergies


Allergies/Adverse Reactions: 


 Allergies











Allergy/AdvReac Type Severity Reaction Status Date / Time


 


No Known Allergies Allergy   Verified 07/10/17 17:04














- Home Medications


Home Medications: 


Ambulatory Orders





Atenolol [Tenormin -] 25 mg PO DAILY 07/10/17 


Levothyroxine [Synthroid -] 112 mcg PO DAILY 07/10/17 











Family Disease History





- Family Disease History


Family Disease History: Heart Disease: Father (HTN, Afib, Alzheimers,  4 

years ago), Mother (diverticulosis, COPD,Pulm HTN  6 months ago), 

Respiratory: Mother, Other: Father, Mother





Review of Systems





- Review of Systems


Constitutional: denies: Chills, Fever, Loss of Appetite, Weakness


Eyes: denies: Blurred Vision


HENT: denies: Difficult Swallowing, Throat Pain, Ringing in Ears


Neck: reports: Lumps (Has fullness of the neck bilaterally with background 

Hashimoto's throiditis and a thyroid nodule)


Cardiovascular: denies: Chest Pain, Edema, Palpitations, Shortness of Breath


Respiratory: reports: Cough (Started yesterday, non productive).  denies: 

Hemoptysis, SOB


Genitourinary: denies: Dysuria, Flank Pain


Musculoskeletal: denies: Joint Swelling


Integumentary: denies: Bruising


Psychiatric: denies: Anxiety, Depression





Physical Exam


Vital Signs: 


 Vital Signs











Temperature  99 F   07/10/17 23:20


 


Pulse Rate  74   17 08:00


 


Respiratory Rate  15   17 08:00


 


Blood Pressure  122/81   17 08:00


 


O2 Sat by Pulse Oximetry (%)  97   17 08:00











Constitutional: Yes: Calm, Obese


Eyes: Yes: EOM Intact.  No: Sclera Icterus


HENT: No: Nasal Congestion, Rhinnorhea, Tonsillar Exudate


Neck: Yes: Thyromegaly (Bilateral neck swelling).  No: Tenderness


Cardiovascular: Yes: S1, S2.  No: Tachycardia


Gastrointestinal: Yes: Normal Bowel Sounds, Abdomen, Obese, Tenderness, Rebound 

(moderate tenderness over the suprapubic region, mild tenderness over both R 

and L hypochondrial areas. Rebound tenderness not clear. Psoas sign also not 

clear).  No: Distention, Palpable Mass, Tenderness, Epigastrium


...Rectal Exam: Yes: Deferred


Renal/: No: CVA Tenderness - Left, CVA Tenderness - Right, Ascencio Present


Musculoskeletal: Yes: Other (Has a history of a fall on her back with 

protruding discs, but no disc protrusions on palpation).  No: Back Pain


Extremities: Yes: Other (Obese with large lower limbs)


Edema: No


Peripheral Pulses WNL: Yes


Neurological: Yes: Alert, Oriented.  No: Aphasia, Confusion, Facial Droop, 

Lethargy


...Motor Strength: WNL, LUE, LLE, RUE, RLE


Psychiatric: Yes: Alert, Oriented.  No: Agitated





Imaging





- Results


Cat Scan: Other (CT scan of abdomen pelvis: Queries acute diverticulitis, R/O 

perforated diverticulosis,or colitis)





Problem List





- Problems


(1) Acute diverticulitis


Assessment/Plan: 


Acute diverticulitis with rupture and localized pnuemoperitoneum


 R/O Merkels diverticulum


Code(s): K57.92 - DVTRCLI OF INTEST, PART UNSP, W/O PERF OR ABSCESS W/O BLEED





(2) Small bowel obstruction


Assessment/Plan: 


Inflammed small bowel on imaging 


Code(s): K56.69 - OTHER INTESTINAL OBSTRUCTION





(3) Status post cholecystectomy


Assessment/Plan: 


20 years post cholecystectomy


Code(s): Z90.49 - ACQUIRED ABSENCE OF OTHER SPECIFIED PARTS OF DIGESTIVE TRACT








Assessment/Plan


Acute diverticulitis one day on i.v flagyl and levofloxacin 


getting i.v acetaminophen


some improvement





Plan 


blood cultures


urine cultures


Continue Iv Flagyl and levofloxacin








Visit type





- Emergency Visit


Emergency Visit: No





- New Patient


This patient is new to me today: Yes


Date on this admission: 17





- Critical Care


Critical Care patient: No

## 2017-07-11 NOTE — CON.GI
Consult


Consult Specialty:: GASTROENTEROLOGY


Referred by:: YANICK FARAH MD





- History of Present Illness


Chief Complaint: ABDOMINAL PAIN/ABNORMAL CT SCAN


History of Present Illness: 


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 

HYPOTHYROIDISM STARTED TO HAVE PERIUMBILICAL PAIN LAST THURSDAY WITHOUT FEVER 

OR CHILLS, DIARRHEA OR VOMITING.  SHE THOUGHT IT WAS RELATED TO SOMETHING SHE 

ATE.  SHE RESTED AND THE PAIN GOT BETTER BUT NEVER WENT AWAY.  SHE CONTINUED TO 

COME TO WORK. SHE WORKED THE WEEKEND AND YESTERDAY AT 3PM SHE DEVELOPED SEVERE 

PERIUMBILICAL PAIN AND DYSURIA.  WHICH PROMPTED AN ER VISIT.  IN THE ER HER WBC 

COUNT WAS FOUND TO BE SLIGHTLY ELEVATED.  SHE HAD NO FEVER AND A CT SCAN SHOWED 

DILATED THICKENED LOOPS OF SMALL BOWEL AND THICKENED MESENTARY AND INFLAMMATION 

WITH SMALL FOCI OF AIR IN THE UPPER PELVIS.  THE MID SIGMOID COLON WAS ALSO IN 

THIS AREA AND IT TOO LOOKED THICKENED AND THERE WAS DIVERTICULOSIS.  THE 

SUGGESTION ON THE READ BY DR SALDIVAR WAS THAT THIS COULD REPRESENT A ACUTE 

ISCHEMIA OF THE SMALL INTESTINE WITH OR WITHOUT PERFORATION OR A PERFORATED 

DIVERTICULITIS.





SHE WAS MADE NPO AND PLACED ON ANTIBIOTICS.  SHE WAS SEEN BY SURGICAL 

CONSULTANT.  SHE STILL HAS PAIN BUT IT HAS IMPROVED.  SHE HAS HAD NO FEVER. SHE 

IS PASSING FLATUS.  SHE HAS NOT YETHAD A COLONOSCOPY AS SHE HAS JUST TURNED 51.

  SHE HAS NO GI HISTORY OF DIVERTICULAR DIASEASE OR DIVERTICULITIS.  THERE IS 

NO RECENT TRAVEL, ABDOMINAL TRAUMA OR SURGERY. SHE HAS A HISTORY OF A TUBAL 

LIGATION AND CHOLECYSECTOMY





- History Source


History Provided By: Patient


Limitations to Obtaining History: No Limitations





- Past Medical History


CNS: No: Alzheimer's, CVA, Dementia, Migraine, Multiple Sclerosis, Peripheral 

Neuropathy, Parkinson's, Seizure, Syncope, TIA, Vertigo, Other


Cardio/Vascular: Yes: Other (Mitral valve prolapse).  No: AFIB, Aneurysm, 

Aortic Insufficiency, Aortic Stenosis, CAD, CHF, Deep Vein Thrombosis, HTN, 

Hyperlipdemia, MI, Mitral Insufficiency, Mitral Stenosis, Murmur, Pulmonary 

Hypertension


Pulmonary: No: Asthma, Bronchitis, Cancer, COPD, O2 Dependent, Pneumonia, 

Previously Intubated, Pulmonary Embolus, Pulmonary Fibrosis, Sleep Apnea, Other


Gastrointestinal: Yes: Other (Occassioanl acid reflux, relieved by position).  

No: Ascites, Cancer, Constipation, Crohn's Disease, Diverticulitis, 

Diverticulosis, Esophageal Varices, Gastritis, GERD, GI Bleed, Hemorrhoids, 

Hiatal Hernia, Inflamatory Bowel Disease, Irritable Bowel Disease, Pancreatitis

, Peptic Ulcer Disease, Ulcerative Colitis


Hepatobiliary: No: Cirrhosis, Cholelithiasis, Cholecystitis, Choledocholithiasis

, Hepatitis A, Hepatitis B, Hepatitis C, Other


Renal/: No: Renal Failure, Renal Inusuff, BPH, Cancer, Hematuria, Hemodialysis

, Neurogenic Bladder, Renal Calculi, UTI, Other


Reproductive: No: Ectopic Pregnancy, Endometriosis, Fibroids, PID, Polycystic 

Ovary Syndrome, Postmenopausal, Other


...LMP Comment: about 1 year ago


...Pregnant: No (menopause)


Heme/Onc: No: Anemia, B12 Deficiency, Bleeding Disorder, Cancer, Current 

Chemotherapy, Current Radiation Therapy, Hemochromatosis, Hypercoaguable State, 

Myeloproliferative Synd, Sickle Cell Disease, Sickle Cell Trait, 

Thrombocytopenia, Other


Infectious Disease: No: AIDS, C-Diff, Herpes Zoster, HIV, MRSA, STD's, 

Tuberculosis, VREF, Other


Psych: No: Addictions, Anxiety, Bipolar, Depression, Panic, Psychosis, 

Schizophrenia, Other


Musculoskeletal: No: Bursitis, Chronic low back pain, Hemiparesis, Hemiplegia, 

Osteoarthritis, Paraplegia, Other


Rheumatology: No: Fibromyalgia, Gout, Lupus, Rheumatoid Arthritis, Sarcoidosis, 

Vasculitis, Other


ENT: No: Allergic Rhinitis, Sinusitis, Other


Endocrine: Yes: Hypothyroidism.  No: Jairo's Disease, Cushing's Disease, 

Diabetes Insipidus, Diabetes Mellitus, Hyperparathyroidism, Hyperthyroidism, 

Osteopenia, SIADH, Other





- Past Surgical History


Past Surgical History: Yes: Cholecystectomy (Had gall bladder removal about 23 

years ago), Tubal Ligation





- Alcohol/Substance Use


Hx Alcohol Use: No





- Smoking History


Smoking history: Never smoked


Have you smoked in the past 12 months: No





- Social History


Usual Living Arrangement: With Spouse


ADL: Independent





Home Medications





- Allergies


Allergies/Adverse Reactions: 


 Allergies











Allergy/AdvReac Type Severity Reaction Status Date / Time


 


No Known Allergies Allergy   Verified 07/10/17 17:04














- Home Medications


Home Medications: 


Ambulatory Orders





Atenolol [Tenormin -] 25 mg PO DAILY 07/10/17 


Levothyroxine [Synthroid -] 112 mcg PO DAILY 07/10/17 











Family Disease History





- Family Disease History


Family Disease History: Heart Disease: Father (HTN, Afib, Alzheimers,  4 

years ago), Mother (diverticulosis, COPD,Pulm HTN  6 months ago), 

Respiratory: Mother, Other: Father, Mother





Review of Systems





- Review of Systems


Constitutional: reports: Other (AS ABOVE)


Eyes: reports: No Symptoms


HENT: reports: No Symptoms


Neck: reports: No Symptoms


Cardiovascular: reports: No Symptoms


Respiratory: reports: No Symptoms


Gastrointestinal: reports: Other (AS ABOVE)


Genitourinary: reports: Dysuria


Musculoskeletal: reports: No Symptoms


Integumentary: reports: No Symptoms


Neurological: reports: No Symptoms


Endocrine: reports: No Symptoms


Hematology/Lymphatic: reports: No Symptoms


Psychiatric: reports: No Symptoms





Physical Exam-GI


Vital Signs: 


 Vital Signs











Temperature  99.0 F   17 17:25


 


Pulse Rate  74   17 16:00


 


Respiratory Rate  20   17 16:00


 


Blood Pressure  135/81   17 16:00


 


O2 Sat by Pulse Oximetry (%)  99   17 09:00











Constitutional: Yes: Obese


Eyes: Yes: Conjunctiva Clear


HENT: Yes: Normocephalic


Neck: Yes: Supple


Cardiovascular: Yes: Regular Rate and Rhythm


Respiratory: Yes: Regular


Gastrointestinal Inspection: Yes: WNL


...Auscultate: Yes: Hypoactive Bowel Sounds


...Palpate: Yes: Tenderness, Other (PERIUMBILICAL AND LOWER ABDOMEN WITHOUT 

GUARDING OR REBOUND)


Genitourinary: Yes: WNL


Musculoskeletal: Yes: WNL


Extremities: Yes: WNL


Neurological: Yes: Alert, Oriented


Labs: 


 CBC, BMP





 17 08:35 





 17 08:35 





 Laboratory Tests











  07/10/17 07/10/17 07/10/17





  18:06 18:06 18:06


 


WBC  10.1 H D  


 


RBC  4.31  


 


Hgb  12.3  D  


 


Hct  37.4  


 


MCV  86.8  


 


MCH  28.6  


 


MCHC  32.9  


 


RDW  13.6  


 


Plt Count  356  D  


 


MPV  7.3 L  


 


Neutrophils %  86.2 H D  


 


Lymphocytes %  9.8  D  


 


Monocytes %  2.8 L  


 


Eosinophils %  0.9  


 


Basophils %  0.3  


 


ESR   


 


Sodium   


 


Potassium   


 


Chloride   


 


Carbon Dioxide   


 


Anion Gap   


 


BUN   


 


Creatinine   


 


Random Glucose   


 


Lactic Acid   


 


Calcium   


 


Total Bilirubin   0.3  D 


 


AST   23  D 


 


ALT   53  D 


 


Alkaline Phosphatase   158 H D 


 


Total Protein   6.6 


 


Albumin   3.5 


 


Lipase    65 L


 


Serum Pregnancy, Qual   














  07/10/17 07/10/17 07/10/17





  20:08 21:35 22:07


 


WBC   


 


RBC   


 


Hgb   


 


Hct   


 


MCV   


 


MCH   


 


MCHC   


 


RDW   


 


Plt Count   


 


MPV   


 


Neutrophils %   


 


Lymphocytes %   


 


Monocytes %   


 


Eosinophils %   


 


Basophils %   


 


ESR    10


 


Sodium   


 


Potassium   


 


Chloride   


 


Carbon Dioxide   


 


Anion Gap   


 


BUN   


 


Creatinine   


 


Random Glucose   


 


Lactic Acid   1.3 


 


Calcium   


 


Total Bilirubin   


 


AST   


 


ALT   


 


Alkaline Phosphatase   


 


Total Protein   


 


Albumin   


 


Lipase   


 


Serum Pregnancy, Qual  Negative  














  17





  08:35 08:35


 


WBC  10.9 H 


 


RBC  3.84 


 


Hgb  11.2 


 


Hct  33.5 


 


MCV  87.1 


 


MCH  29.1 


 


MCHC  33.4 


 


RDW  13.6 


 


Plt Count  269  D 


 


MPV  6.8 L 


 


Neutrophils %  85.0 H 


 


Lymphocytes %  10.7 


 


Monocytes %  3.6 L 


 


Eosinophils %  0.3 


 


Basophils %  0.4 


 


ESR  


 


Sodium   141


 


Potassium   4.2


 


Chloride   105


 


Carbon Dioxide   28


 


Anion Gap   8


 


BUN   8  D


 


Creatinine   0.6  D


 


Random Glucose   110 H


 


Lactic Acid  


 


Calcium   8.2 L


 


Total Bilirubin  


 


AST  


 


ALT  


 


Alkaline Phosphatase  


 


Total Protein  


 


Albumin  


 


Lipase  


 


Serum Pregnancy, Qual  














Imaging





- Results


Cat Scan: Image Reviewed





Problem List





- Problems


(1) Diverticulitis of colon with perforation


Assessment/Plan: 


I BELIEVE THAT THIS IS DIVERTICULITIS WITH PERFORATION WITHOUT FLUID COLLECTION 

OR ABSCESS.  I AGREE WITH THE TREATMENT PLAN.  THIS IS NOT PANCREATITIS AS WAS 

SUGGESTED.  SMALL BOWEL ENTERITIS WITH PERFORATION IS LESS LIKELY.





WOULD KEEP NPO


MONITOR CBC, TEMP, AND ESR/CRP


REPEAT CT SCAN IN A NUMBER OF DAYS WOULD DO CT ENTEROGRAPHY





KACI SUERO MD


Code(s): K57.20 - DVTRCLI OF LG INT W PERFORATION AND ABSCESS W/O BLEEDING   





(2) Enteritis


Code(s): K52.9 - NONINFECTIVE GASTROENTERITIS AND COLITIS, UNSPECIFIED





(3) Diverticulosis


Code(s): K57.90 - DVRTCLOS OF INTEST, PART UNSP, W/O PERF OR ABSCESS W/O BLEED 

  





(4) Abdominal pain


Code(s): R10.9 - UNSPECIFIED ABDOMINAL PAIN   Qualifiers: 


     Abdominal location: periumbilical     Qualified Code(s): R10.33 - 

Periumbilical pain  





(5) Other specified hypothyroidism


Code(s): E03.8 - OTHER SPECIFIED HYPOTHYROIDISM

## 2017-07-11 NOTE — CONSULT
Consult


Consult Specialty:: Surgery


Reason for Consultation:: Abdominal pain





- History of Present Illness


Chief Complaint: Abdominal pain


History of Present Illness: 


51 female presents to the hospital for acute onset of abdominal pain


States that 5 days prior she had developed periumbilical pain


Had somewhat resolved but yesterday had become severe


Pain was to the right of the umbilicus and radiated to the pelvis


No prior colonoscopy


Denies fevers/chills


+ Diarrhea





- History Source


History Provided By: Patient, Medical Record


Limitations to Obtaining History: No Limitations





- Past Medical History


Cardio/Vascular: Yes: Other (Mitral valve prolapse)


...LMP Comment: about 1 year ago


...Pregnant: No (menopause)


Endocrine: Yes: Hypothyroidism





- Past Surgical History


Past Surgical History: Yes: Cholecystectomy, Tubal Ligation





- Alcohol/Substance Use


Hx Alcohol Use: No





- Smoking History


Smoking history: Never smoked


Have you smoked in the past 12 months: No





- Social History


Usual Living Arrangement: With Spouse


ADL: Independent





Home Medications





- Allergies


Allergies/Adverse Reactions: 


 Allergies











Allergy/AdvReac Type Severity Reaction Status Date / Time


 


No Known Allergies Allergy   Verified 07/10/17 17:04














- Home Medications


Home Medications: 


Ambulatory Orders





Atenolol [Tenormin -] 25 mg PO DAILY 07/10/17 


Levothyroxine [Synthroid -] 112 mcg PO DAILY 07/10/17 











Family Disease History





- Family Disease History


Family History: Unremarkable





Review of Systems





- Review of Systems


Constitutional: denies: Chills, Fever


Eyes: reports: No Symptoms


HENT: reports: No Symptoms


Neck: reports: No Symptoms


Cardiovascular: denies: Chest Pain


Respiratory: denies: Cough


Gastrointestinal: reports: Abdominal Pain, Diarrhea.  denies: Nausea, Vomiting


Genitourinary: reports: No Symptoms


Neurological: denies: Change in LOC


Pain Intensity: 6





Physical Exam


Vital Signs: 


 Vital Signs











Temperature  99 F   07/10/17 23:20


 


Pulse Rate  68   07/11/17 06:00


 


Respiratory Rate  15   07/11/17 06:00


 


Blood Pressure  106/66   07/11/17 06:00


 


O2 Sat by Pulse Oximetry (%)  98   07/10/17 23:20











Constitutional: Yes: Calm


Eyes: Yes: WNL


HENT: Yes: WNL


Neck: Yes: Supple


Cardiovascular: Yes: Regular Rate and Rhythm


Respiratory: Yes: CTA Bilaterally


Gastrointestinal: Yes: Soft, Tenderness (Mild right periumbilical tenderness, 

suprapubic/LLQ tenderness).  No: Distention, Tenderness, Rebound


Extremities: Yes: WNL


Neurological: Yes: Alert


Labs: 


 CBC, BMP





 07/10/17 18:06 





 07/10/17 18:06 











Imaging





- Results


Cat Scan: Report Reviewed, Image Reviewed





Problem List





- Problems


(1) Abdominal pain


Code(s): R10.9 - UNSPECIFIED ABDOMINAL PAIN   Qualifiers: 


     Abdominal location: periumbilical     Qualified Code(s): R10.33 - 

Periumbilical pain  








Assessment/Plan


51 female with acute onset of abdominal pain


Pain somewhat improved


Possible etiologies include mesenteric ischemia, diverticulitis, enteritis, 

infectious/inflammatory colitis


No large free air noted


Very small right periumbilical hernia seen on CT as well


Lactate WNL: 1.3





Stool for culture, C Diff


NPO


IV fluid resuscitation


Antibiotics


Serial abdominal exams

## 2017-07-11 NOTE — HP
Admitting History and Physical





- Primary Care Physician


PCP: Jes iGlman





- Admission


Chief Complaint: ABD PAIN


History of Present Illness: 


50 Y/O FEMALE WITH H/O OBESITY, HTN, HYPOTHYROID PRESENTS TO ER LAST NIGHT WITH 

SUDDEN, ACUTE SEVERE ABD PAIN. CT ABD SHOWS PERFORATION VS SBO VS 

DIVERTICULITIS. SURGERY AND GI BOTH HAVE EVALUATE AND AGREE WITH CURRENT TX 

PLAN.


NO RECENT TRAVEL


NO HISTORY OF ABD DISEASE





- Past Medical History


CNS: No: Alzheimer's, CVA, Dementia, Migraine, Multiple Sclerosis, Peripheral 

Neuropathy, Parkinson's, Seizure, Syncope, TIA, Vertigo, Other


Cardiovascular: Yes: Other (Mitral valve prolapse).  No: AFIB, Aneurysm, Aortic 

Insufficiency, Aortic Stenosis, CAD, CHF, Deep Vein Thrombosis, HTN, 

Hyperlipdemia, MI, Mitral Insufficiency, Mitral Stenosis, Murmur, Pulmonary 

Hypertension


Pulmonary: No: Asthma, Bronchitis, Cancer, COPD, O2 Dependent, Pneumonia, 

Previously Intubated, Pulmonary Embolus, Pulmonary Fibrosis, Sleep Apnea, Other


Gastrointestinal: Yes: Other (Occassioanl acid reflux, relieved by position).  

No: Ascites, Cancer, Constipation, Crohn's Disease, Diverticulitis, 

Diverticulosis, Esophageal Varices, Gastritis, GERD, GI Bleed, Hemorrhoids, 

Hiatal Hernia, Inflamatory Bowel Disease, Irritable Bowel Disease, Pancreatitis

, Peptic Ulcer Disease, Ulcerative Colitis


Hepatobiliary: No: Cirrhosis, Cholelithiasis, Cholecystitis, Choledocholithiasis

, Hepatitis A, Hepatitis B, Hepatitis C, Other


Renal/: No: Renal Failure, Renal Inusuff, BPH, Cancer, Hematuria, Hemodialysis

, Neurogenic Bladder, Renal Calculi, UTI, Other


...LMP Comment: about 1 year ago


...Pregnant: No (menopause)


Heme/Onc: No: Anemia, B12 Deficiency, Bleeding Disorder, Cancer, Current 

Chemotherapy, Current Radiation Therapy, Hemochromatosis, Hypercoaguable State, 

Myeloproliferative Synd, Sickle Cell Disease, Sickle Cell Trait, 

Thrombocytopenia, Other


Infectious Disease: No: AIDS, C-Diff, Herpes Zoster, HIV, MRSA, STD's, 

Tuberculosis, VREF, Other


Psych: No: Addictions, Anxiety, Bipolar, Depression, Panic, Psychosis, 

Schizophrenia, Other


Musculoskeletal: No: Bursitis, Chronic low back pain, Hemiparesis, Hemiplegia, 

Osteoarthritis, Paraplegia, Other


Rheumatology: No: Fibromyalgia, Gout, Lupus, Rheumatoid Arthritis, Sarcoidosis, 

Vasculitis, Other


ENT: No: Allergic Rhinitis, Sinusitis, Other


Endocrine: Yes: Hypothyroidism.  No: Jairo's Disease, Cushing's Disease, 

Diabetes Insipidus, Diabetes Mellitus, Hyperparathyroidism, Hyperthyroidism, 

Osteopenia, SIADH, Other





- Past Surgical History


Past Surgical History: Yes: Cholecystectomy (Had gall bladder removal about 23 

years ago), Tubal Ligation





- Smoking History


Smoking history: Never smoked


Have you smoked in the past 12 months: No





- Alcohol/Substance Use


Hx Alcohol Use: No





- Social History


ADL: Independent





Home Medications





- Allergies


Allergies/Adverse Reactions: 


 Allergies











Allergy/AdvReac Type Severity Reaction Status Date / Time


 


No Known Allergies Allergy   Verified 07/10/17 17:04














- Home Medications


Home Medications: 


Ambulatory Orders





Atenolol [Tenormin -] 25 mg PO DAILY 07/10/17 


Levothyroxine [Synthroid -] 112 mcg PO DAILY 07/10/17 











Family Disease History





- Family Disease History


Family Disease History: Heart Disease: Father (HTN, Afib, Alzheimers,  4 

years ago), Mother (diverticulosis, COPD,Pulm HTN  6 months ago), 

Respiratory: Mother, Other: Father, Mother





Review of Systems





- Review of Systems


Constitutional: reports: Loss of Appetite, Weakness


Eyes: reports: No Symptoms


HENT: reports: No Symptoms


Neck: reports: No Symptoms


Cardiovascular: reports: No Symptoms


Respiratory: reports: No Symptoms


Gastrointestinal: reports: Abdominal Pain, Nausea


Genitourinary: reports: No Symptoms


Musculoskeletal: reports: No Symptoms


Integumentary: reports: No Symptoms


Neurological: reports: No Symptoms


Endocrine: reports: No Symptoms


Hematology/Lymphatic: reports: No Symptoms


Psychiatric: reports: No Symptoms





Physical Examination


Vital Signs: 


 Vital Signs











Temperature  98.7 F   17 20:00


 


Pulse Rate  80   17 20:00


 


Respiratory Rate  20   17 20:21


 


Blood Pressure  136/77   17 20:00


 


O2 Sat by Pulse Oximetry (%)  99   17 20:24











Constitutional: Yes: Moderate Distress


Eyes: Yes: WNL


HENT: Yes: WNL


Neck: Yes: WNL


Cardiovascular: Yes: WNL


Respiratory: Yes: Cough


Gastrointestinal: Yes: Tenderness


Renal/: Yes: WNL


Musculoskeletal: Yes: WNL


Extremities: Yes: WNL


Edema: No


Peripheral Pulses WNL: Yes


Integumentary: Yes: WNL


Wound/Incision: Yes: Clean/Dry


Neurological: Yes: WNL


...Motor Strength: WNL


Psychiatric: Yes: WNL


Labs: 


 CBC, BMP





 17 08:35 





 17 08:35 











Imaging





- Results


Cat Scan: Report Reviewed





Problem List





- Problems


(1) Abdominal pain


Code(s): R10.9 - UNSPECIFIED ABDOMINAL PAIN   Qualifiers: 


     Abdominal location: periumbilical     Qualified Code(s): R10.33 - 

Periumbilical pain  





(2) Acute diverticulitis


Code(s): K57.92 - DVTRCLI OF INTEST, PART UNSP, W/O PERF OR ABSCESS W/O BLEED





(3) Diverticulitis of colon with perforation


Code(s): K57.20 - DVTRCLI OF LG INT W PERFORATION AND ABSCESS W/O BLEEDING   





(4) Small bowel obstruction


Code(s): K56.69 - OTHER INTESTINAL OBSTRUCTION








Assessment/Plan


IV ABX


NPO


IVF


PAIN CONTROL


ZOFRAN IV


GI/SX/ID FOLLOW APPRECIATED

## 2017-07-12 LAB
ALBUMIN SERPL-MCNC: 2.7 G/DL (ref 3.4–5)
ALP SERPL-CCNC: 146 U/L (ref 45–117)
ALT SERPL-CCNC: 59 U/L (ref 12–78)
ANION GAP SERPL CALC-SCNC: 10 MMOL/L (ref 8–16)
AST SERPL-CCNC: 37 U/L (ref 15–37)
BASOPHILS # BLD: 0.2 % (ref 0–2)
BILIRUB SERPL-MCNC: 0.9 MG/DL (ref 0.2–1)
CALCIUM SERPL-MCNC: 8.1 MG/DL (ref 8.5–10.1)
CO2 SERPL-SCNC: 25 MMOL/L (ref 21–32)
CREAT SERPL-MCNC: 0.5 MG/DL (ref 0.55–1.02)
DEPRECATED RDW RBC AUTO: 13.6 % (ref 11.6–15.6)
EOSINOPHIL # BLD: 1 % (ref 0–4.5)
GLUCOSE SERPL-MCNC: 95 MG/DL (ref 74–106)
MCH RBC QN AUTO: 29 PG (ref 25.7–33.7)
MCHC RBC AUTO-ENTMCNC: 33.7 G/DL (ref 32–36)
MCV RBC: 86.1 FL (ref 80–96)
NEUTROPHILS # BLD: 87.3 % (ref 42.8–82.8)
PLATELET # BLD AUTO: 298 K/MM3 (ref 134–434)
PMV BLD: 7.6 FL (ref 7.5–11.1)
PROT SERPL-MCNC: 5.6 G/DL (ref 6.4–8.2)
T4 FREE SERPL-MCNC: 1.15 NG/DL (ref 0.76–1.46)
TSH SERPL-ACNC: 4.06 UIU/ML (ref 0.36–3.74)
WBC # BLD AUTO: 10.3 K/MM3 (ref 4–10)

## 2017-07-12 RX ADMIN — METRONIDAZOLE SCH MLS/HR: 500 INJECTION, SOLUTION INTRAVENOUS at 18:27

## 2017-07-12 RX ADMIN — SODIUM CHLORIDE SCH MLS/HR: 9 INJECTION, SOLUTION INTRAVENOUS at 08:30

## 2017-07-12 RX ADMIN — ONDANSETRON PRN MG: 2 INJECTION INTRAMUSCULAR; INTRAVENOUS at 09:30

## 2017-07-12 RX ADMIN — POTASSIUM CHLORIDE, DEXTROSE MONOHYDRATE AND SODIUM CHLORIDE SCH MLS/HR: 150; 5; 900 INJECTION, SOLUTION INTRAVENOUS at 11:10

## 2017-07-12 RX ADMIN — ONDANSETRON PRN MG: 2 INJECTION INTRAMUSCULAR; INTRAVENOUS at 18:26

## 2017-07-12 RX ADMIN — LEVOFLOXACIN SCH MLS/HR: 5 INJECTION, SOLUTION INTRAVENOUS at 09:30

## 2017-07-12 RX ADMIN — ACETAMINOPHEN PRN MG: 10 INJECTION, SOLUTION INTRAVENOUS at 06:41

## 2017-07-12 RX ADMIN — ENOXAPARIN SODIUM SCH MG: 40 INJECTION SUBCUTANEOUS at 09:05

## 2017-07-12 RX ADMIN — ACETAMINOPHEN PRN MG: 10 INJECTION, SOLUTION INTRAVENOUS at 00:41

## 2017-07-12 RX ADMIN — PANTOPRAZOLE SODIUM SCH MG: 40 INJECTION, POWDER, FOR SOLUTION INTRAVENOUS at 09:05

## 2017-07-12 RX ADMIN — ONDANSETRON PRN MG: 2 INJECTION INTRAMUSCULAR; INTRAVENOUS at 13:42

## 2017-07-12 RX ADMIN — METRONIDAZOLE SCH MLS/HR: 500 INJECTION, SOLUTION INTRAVENOUS at 09:03

## 2017-07-12 RX ADMIN — METRONIDAZOLE SCH MLS/HR: 500 INJECTION, SOLUTION INTRAVENOUS at 01:44

## 2017-07-12 NOTE — PN
Teaching Attending Note


Name of Resident: Leslie Sahni


ATTENDING PHYSICIAN STATEMENT





I saw and evaluated the patient.


I reviewed the resident's note and discussed the case with the resident.


I agree with the resident's findings and plan as documented.








SUBJECTIVE:








OBJECTIVE:








ASSESSMENT AND PLAN:


Acute complicated diverticulitis


Continue empiric levaquin/ flagyl

## 2017-07-12 NOTE — PN
Progress Note, Physician


History of Present Illness: 


third day on flagyl and levofloxacin and is on NPO. 


Feels better today 4/10 pain


Had 2 episodes of vomiting yesterday, yellowish, non bloody that she attributes 

to narcotics, has happened before


Had another episode this afternoon, no fevers, no chills


Had a bowel movement of solid stool today, non bloody


Overall feels better





- Current Medication List


Current Medications: 


Active Medications





Acetaminophen (Ofirmev Injection -)  1,000 mg IVPB Q6H PRN


   PRN Reason: FEVER OR PAIN


   Stop: 07/13/17 04:28


   Last Admin: 07/12/17 13:40 Dose:  1,000 mg


Enoxaparin Sodium (Lovenox -)  40 mg SQ DAILY [facility]


   Last Admin: 07/12/17 09:05 Dose:  40 mg


Metronidazole (Flagyl 500mg Premixed Ivpb -)  100 mls @ 100 mls/hr IVPB Q8H-IV 

[facility]


   Last Admin: 07/12/17 09:03 Dose:  100 mls/hr


Levofloxacin (Levaquin 500 Mg Premixed Ivpb -)  100 mls @ 100 mls/hr IVPB DAILY 

[facility]


   Last Admin: 07/12/17 09:30 Dose:  100 mls/hr


Dextrose/Sodium Chloride (Dextrose 5%-Normal Saline+20 Meq Kcl -)  1,000 mls @ 

100 mls/hr IV ASDIR [facility]


   Last Admin: 07/12/17 11:10 Dose:  100 mls/hr


Metoprolol Tartrate (Lopressor Injection -)  5 mg IVPUSH Q4H PRN


   PRN Reason: HYPERTENSION


Morphine Sulfate (Morphine Injection -)  2 mg IVPUSH Q3H PRN


   PRN Reason: PAIN


   Last Admin: 07/12/17 11:04 Dose:  2 mg


Ondansetron HCl (Zofran Injection)  8 mg IVPUSH Q6H PRN


   PRN Reason: NAUSEA AND/OR VOMITING


   Last Admin: 07/12/17 13:42 Dose:  8 mg


Pantoprazole Sodium (Protonix 40mg Ivpb (Pre-Docked))  40 mg IVPB DAILY [facility]


   Last Admin: 07/12/17 09:05 Dose:  40 mg











- Objective


Vital Signs: 


 Vital Signs











Temperature  98.6 F   07/12/17 14:00


 


Pulse Rate  82   07/12/17 14:00


 


Respiratory Rate  15   07/12/17 14:00


 


Blood Pressure  133/83   07/12/17 14:00


 


O2 Sat by Pulse Oximetry (%)  99   07/11/17 20:24











Constitutional: Yes: Calm


Eyes: Yes: EOM Intact


HENT: No: Nasal Congestion, Pharyngeal Erythema


Neck: Yes: Supple.  No: Rigid, Tenderness


Cardiovascular: Yes: Regular Rate and Rhythm, S1, S2


Respiratory: Yes: Cough (dry cough).  No: On Nasal O2, On Venti-Mask, Rales, 

Rhonchi, Wheezes


Gastrointestinal: Yes: Hypoactive Bowel Sounds (Bowel sounds present), Other (

No areas of tenderness)


...Rectal Exam: Yes: Deferred


Genitourinary: No: Anuria, Ascencio Present (ambulating to bathroom and back to 

pass urine)


Musculoskeletal: No: Joint Stiffness, Joint Swelling


Edema: No


Labs: 


 CBC, BMP





 07/12/17 05:20 





 07/12/17 05:20 











Problem List





- Problems


(1) Acute diverticulitis


Assessment/Plan: 


Acute diverticulitis with rupture and localized pnuemoperitoneum


 R/O Merkels diverticulum


Code(s): K57.92 - DVTRCLI OF INTEST, PART UNSP, W/O PERF OR ABSCESS W/O BLEED





(2) Small bowel obstruction


Assessment/Plan: 


Inflammed small bowel on imaging , some improvement, passed stool today


Code(s): K56.69 - OTHER INTESTINAL OBSTRUCTION





(3) Status post cholecystectomy


Assessment/Plan: 


20 years post cholecystectomy


Code(s): Z90.49 - ACQUIRED ABSENCE OF OTHER SPECIFIED PARTS OF DIGESTIVE TRACT








Impression/Plan


Impression/Plan: 


Rupture diverticulum with inflammed small bowel,


3rd day on levofloxacin and flagyl


remaining afebrile


awaiting results of blood culture


Moved bowel today


on ondansetron for nausea/vomiting likely due to narcotics





Plan: 


Continue levofloxacina and flagyl


awaiting culture results





Visit type





- Emergency Visit


Emergency Visit: No





- New Patient


This patient is new to me today: No





- Critical Care


Critical Care patient: No





- Discharge Referral


Referred to [facility] No

## 2017-07-12 NOTE — PN
Progress Note, Physician


Chief Complaint: 


Abdominal pain


History of Present Illness: 


Mild discomfort this am in hypogastric/suprapubic region, mild nausea after 

receiving morphine for pain, felt better after receiving zofran IVPB, has 

family hx of diverticulosis on mothers side. No previous episode. No 

colonoscopy in the past. 


Had solid BM this AM-likely no cdiff present, +flatulence, seen by surgery, no 

sx recommended at this time. NPO.





- Current Medication List


Current Medications: 


Active Medications





Acetaminophen (Ofirmev Injection -)  1,000 mg IVPB Q6H PRN


   PRN Reason: FEVER OR PAIN


   Stop: 07/13/17 04:28


   Last Admin: 07/12/17 13:40 Dose:  1,000 mg


Enoxaparin Sodium (Lovenox -)  40 mg SQ DAILY [facility]


   Last Admin: 07/12/17 09:05 Dose:  40 mg


Metronidazole (Flagyl 500mg Premixed Ivpb -)  100 mls @ 100 mls/hr IVPB Q8H-IV 

[facility]


   Last Admin: 07/12/17 09:03 Dose:  100 mls/hr


Levofloxacin (Levaquin 500 Mg Premixed Ivpb -)  100 mls @ 100 mls/hr IVPB DAILY 

[facility]


   Last Admin: 07/12/17 09:30 Dose:  100 mls/hr


Dextrose/Sodium Chloride (Dextrose 5%-Normal Saline+20 Meq Kcl -)  1,000 mls @ 

100 mls/hr IV ASDIR [facility]


   Last Admin: 07/12/17 11:10 Dose:  100 mls/hr


Levothyroxine Sodium (Synthroid Injection -)  75 mcg IVPUSH DAILY [facility]


Metoprolol Tartrate (Lopressor Injection -)  5 mg IVPUSH Q4H PRN


   PRN Reason: HYPERTENSION


Morphine Sulfate (Morphine Injection -)  2 mg IVPUSH Q3H PRN


   PRN Reason: PAIN


   Last Admin: 07/12/17 11:04 Dose:  2 mg


Ondansetron HCl (Zofran Injection)  8 mg IVPUSH Q6H PRN


   PRN Reason: NAUSEA AND/OR VOMITING


   Last Admin: 07/12/17 13:42 Dose:  8 mg


Pantoprazole Sodium (Protonix 40mg Ivpb (Pre-Docked))  40 mg IVPB DAILY [facility]


   Last Admin: 07/12/17 09:05 Dose:  40 mg











- Objective


Vital Signs: 


 Vital Signs











Temperature  99.0 F   07/12/17 16:00


 


Pulse Rate  95 H  07/12/17 16:00


 


Respiratory Rate  20   07/12/17 16:00


 


Blood Pressure  126/81   07/12/17 16:00


 


O2 Sat by Pulse Oximetry (%)  99   07/11/17 20:24











Constitutional: Yes: Well Nourished, No Distress, Calm


Cardiovascular: Yes: Regular Rate and Rhythm


Respiratory: Yes: Regular


Gastrointestinal: Yes: Normal Bowel Sounds, Tenderness (suprapubic)


Musculoskeletal: Yes: WNL


Extremities: Yes: WNL


Edema: No


Peripheral Pulses WNL: Yes


Neurological: Yes: Alert, Oriented


Psychiatric: Yes: Alert, Oriented


Labs: 


 CBC, BMP





 07/12/17 05:20 





 07/12/17 05:20 











Problem List





- Problems


(1) Abdominal pain


Assessment/Plan: 


-pain management


-NPO


-IVF


-IV abx


-monitor for any diarrhea


Code(s): R10.9 - UNSPECIFIED ABDOMINAL PAIN   Qualifiers: 


     Abdominal location: periumbilical     Qualified Code(s): R10.33 - 

Periumbilical pain  





(2) Acute diverticulitis


Assessment/Plan: 


-seen by GI and surgery, no intervention recommended at this time.


Code(s): K57.92 - DVTRCLI OF INTEST, PART UNSP, W/O PERF OR ABSCESS W/O BLEED





(3) Other specified hypothyroidism


Assessment/Plan: 


start IV levothyroxine, recommended dosing 50-70% of PO dose.


Code(s): E03.8 - OTHER SPECIFIED HYPOTHYROIDISM








Assessment/Plan


-IV abx


-IVF


-NPO


-Pain management with morphine


-Antiemetic-zofran increased


-PPI


-DVT prophylaxis


-IV levothyroxine

## 2017-07-12 NOTE — PN
Physical Exam: 


SUBJECTIVE: Patient seen and examined at bedside. Pt continues to have 

abdominal pain but is improved compared to yesterday. Rated as a 5/10 today and 

in same location. Pt states she threw up yesterday and it was mainly yellow in 

color with no blood and felt better after throwing up. Pt is passing some gas 

and had a BM today with solid stool. She denies any fevers, chills, nausea at 

this time.








OBJECTIVE:





 


 Vital Signs











Temperature  98.7 F   07/12/17 09:47


 


Pulse Rate  82   07/12/17 11:52


 


Respiratory Rate  18   07/12/17 11:52


 


Blood Pressure  134/81   07/12/17 11:52


 


O2 Sat by Pulse Oximetry (%)  99   07/11/17 20:24














GENERAL: The patient is awake, alert, and fully oriented, in no acute distress.


HEAD: Normal with no signs of trauma.


EYES: extraocular movements intact, sclera anicteric, conjunctiva clear. No 

ptosis. 


ENT: Ears normal, nares patent


NECK: Trachea midline 


LUNGS: Breath sounds equal, clear to auscultation bilaterally, no wheezes


HEART: Regular rate and rhythm, S1, S2 without murmur, rub or gallop.


ABDOMEN: +periumbilical tenderness, Soft, nondistended, normoactive bowel sounds

, no guarding, no 


rebound


EXTREMITIES: warm, well-perfused 


NEUROLOGICAL: Cranial nerves II through XII grossly intact. Normal speech, gait 

not observed.


PSYCH: Normal mood, normal affect.


SKIN: Warm, dry

















 Laboratory Results - last 24 hr











  07/11/17 07/12/17 07/12/17





  17:15 05:20 05:20


 


WBC    10.3 H


 


RBC    3.77


 


Hgb    10.9


 


Hct    32.5


 


MCV    86.1


 


MCH    29.0


 


MCHC    33.7


 


RDW    13.6


 


Plt Count    298


 


MPV    7.6  D


 


Neutrophils %    87.3 H


 


Lymphocytes %    7.8 L D


 


Monocytes %    3.7 L


 


Eosinophils %    1.0  D


 


Basophils %    0.2


 


ESR   


 


Sodium   142 


 


Potassium   3.8 


 


Chloride   107 


 


Carbon Dioxide   25 


 


Anion Gap   10 


 


BUN   5 L D 


 


Creatinine   0.5 L 


 


Creat Clearance w eGFR   > 60 


 


Random Glucose   95 


 


Calcium   8.1 L 


 


Total Bilirubin   0.9  D 


 


AST   37  D 


 


ALT   59 


 


Alkaline Phosphatase   146 H 


 


C-Reactive Protein   


 


Total Protein   5.6 L 


 


Albumin   2.7 L D 


 


TSH   4.06 H D 


 


Free T4   1.15  D 


 


Urine Color  Yellow  


 


Urine Appearance  Clear  


 


Urine pH  6.0  


 


Ur Specific Gravity  1.025  


 


Urine Protein  Negative  


 


Urine Glucose (UA)  Negative  


 


Urine Ketones  Negative  


 


Urine Blood  Negative  


 


Urine Nitrite  Negative  


 


Urine Bilirubin  Negative  


 


Urine Urobilinogen  Negative  


 


Ur Leukocyte Esterase  Negative  














  07/12/17 07/12/17





  05:20 05:20


 


WBC  


 


RBC  


 


Hgb  


 


Hct  


 


MCV  


 


MCH  


 


MCHC  


 


RDW  


 


Plt Count  


 


MPV  


 


Neutrophils %  


 


Lymphocytes %  


 


Monocytes %  


 


Eosinophils %  


 


Basophils %  


 


ESR   45 H


 


Sodium  


 


Potassium  


 


Chloride  


 


Carbon Dioxide  


 


Anion Gap  


 


BUN  


 


Creatinine  


 


Creat Clearance w eGFR  


 


Random Glucose  


 


Calcium  


 


Total Bilirubin  


 


AST  


 


ALT  


 


Alkaline Phosphatase  


 


C-Reactive Protein  17.2 H 


 


Total Protein  


 


Albumin  


 


TSH  


 


Free T4  


 


Urine Color  


 


Urine Appearance  


 


Urine pH  


 


Ur Specific Gravity  


 


Urine Protein  


 


Urine Glucose (UA)  


 


Urine Ketones  


 


Urine Blood  


 


Urine Nitrite  


 


Urine Bilirubin  


 


Urine Urobilinogen  


 


Ur Leukocyte Esterase  














Active Medications











Generic Name Dose Route Start Last Admin





  Trade Name Freq  PRN Reason Stop Dose Admin


 


Acetaminophen  1,000 mg 07/12/17 10:27  





  Ofirmev Injection -  IVPB 07/13/17 04:28  





  Q6H PRN   





  FEVER OR PAIN   


 


Enoxaparin Sodium  40 mg 07/11/17 11:15 07/12/17 09:05





  Lovenox -  SQ   40 mg





  DAILY [facility]   Administration


 


Metronidazole  100 mls @ 100 mls/hr 07/11/17 10:00 07/12/17 09:03





  Flagyl 500mg Premixed Ivpb -  IVPB   100 mls/hr





  Q8H-IV [facility]   Administration


 


Levofloxacin  100 mls @ 100 mls/hr 07/11/17 10:00 07/12/17 09:30





  Levaquin 500 Mg Premixed Ivpb -  IVPB   100 mls/hr





  DAILY [facility]   Administration


 


Dextrose/Sodium Chloride  1,000 mls @ 100 mls/hr 07/12/17 10:30 07/12/17 11:10





  Dextrose 5%-Normal Saline+20 Meq Kcl -  IV   100 mls/hr





  ASDIR [facility]   Administration


 


Metoprolol Tartrate  5 mg 07/11/17 03:09  





  Lopressor Injection -  IVPUSH   





  Q4H PRN   





  HYPERTENSION   


 


Morphine Sulfate  2 mg 07/12/17 10:26 07/12/17 11:04





  Morphine Injection -  IVPUSH   2 mg





  Q3H PRN   Administration





  PAIN   


 


Ondansetron HCl  8 mg 07/12/17 10:27  





  Zofran Injection  IVPUSH   





  Q6H PRN   





  NAUSEA AND/OR VOMITING   


 


Pantoprazole Sodium  40 mg 07/11/17 10:00 07/12/17 09:05





  Protonix 40mg Ivpb (Pre-Docked)  IVPB   40 mg





  DAILY [facility]   Administration











ASSESSMENT/PLAN:


52 y/o F with PMH of HTN, hypothyroidism presented to ER with 10/10 mid 

abdominal pain with sudden onset. Admitted to ICU for diverticulitis w/

perforation.





Neuro:


Mental status intact





Abd:


Abdominal pain likely secondary to diverticulitis w/perforation


   -Surgery on board, ID on board, GI on board


   -f/u C. Diff, stool cultures


   -c/w levaquin and flagyl


   -pain control with Ofirmev 1g IV q6h PRN, morphine 2mg IV q3h PRN


   -D5-1/2NS +20meq KCl @ 100 ml/hr


   -Protonix 40 mg IVPB qd


   -Zofran 8mg IV q6h PRN for nausea





Endocrine


Hypothyroidism


   -c/w synthroid 150 mcg po qd once able to tolerate PO


   -consider starting IV synthroid 75 mcg daily.





Cardio


HTN


   -c/w lopressor 5 mg IV q4h prn for htn





Prophylaxis


   -DVT: lovenox 40 mg sq qd


   -GI: Protonix 40 mg IV qd





FEN


   -D5-1/2 NS + 20meq KCl @ 100ml/hr


   -Monitor electrolytes, replete as necessary


   -NPO





Dispo:


continue to monitor in ICU








Problem List





- Problems


(1) Abdominal pain


Code(s): R10.9 - UNSPECIFIED ABDOMINAL PAIN   Qualifiers: 


     Abdominal location: periumbilical     Qualified Code(s): R10.33 - 

Periumbilical pain  





(2) Acute diverticulitis


Code(s): K57.92 - DVTRCLI OF INTEST, PART UNSP, W/O PERF OR ABSCESS W/O BLEED





(3) Colitis


Code(s): K52.9 - NONINFECTIVE GASTROENTERITIS AND COLITIS, UNSPECIFIED





(4) Other specified hypothyroidism


Code(s): E03.8 - OTHER SPECIFIED HYPOTHYROIDISM








Visit type





- Emergency Visit


Emergency Visit: Yes


ED Registration Date: 07/10/17


Care time: The patient presented to the Emergency Department on the above date 

and was hospitalized for further evaluation of their emergent condition.





- New Patient


This patient is new to me today: No





- Critical Care


Critical Care patient: Yes


Total Critical Care Time (in minutes): 35


Critical Care Statement: The care of this patient involved high complexity 

decision making to prevent further life threatening deterioration of the patient

's condition and/or to evalute & treat vital organ system(s) failure or risk of 

failure.

## 2017-07-12 NOTE — PN
Progress Note (short form)





- Note


Progress Note: 


No acute events


Abdominal pain controlled


+BM


 Vital Signs











 Period  Temp  Pulse  Resp  BP Sys/Collazo  Pulse Ox


 


 Last 24 Hr  98.6 F-99.0 F    15-24  122-147/76-91  








Abd soft, mild LLQ tenderness, no rebound





 CBC, BMP





 07/12/17 05:20 





 07/12/17 05:20 





Continue antibiotics


IV fluids





Problem List





- Problems


(1) Abdominal pain


Code(s): R10.9 - UNSPECIFIED ABDOMINAL PAIN   Qualifiers: 


     Abdominal location: periumbilical     Qualified Code(s): R10.33 - 

Periumbilical pain

## 2017-07-12 NOTE — PN
Teaching Attending Note


Name of Resident: Blas Wagner


ATTENDING PHYSICIAN STATEMENT





I saw and evaluated the patient.


I reviewed the resident's note and discussed the case with the resident.


I agree with the resident's findings and plan as documented.








SUBJECTIVE:





Pt seen and examined in the ICU. Abdominal pain maybe slightly improved. No 

fevers or chills. +nausea with vomiting overnight which she attributes to 

narcotics. Had brown solid BM this AM.





OBJECTIVE:





 Last Vital Signs











Temp Pulse Resp BP Pulse Ox


 


 98.7 F   81   18   139/88   99 


 


 07/12/17 09:47  07/12/17 09:47  07/12/17 09:47  07/12/17 09:47  07/11/17 20:24








 Intake & Output











 07/09/17 07/10/17 07/11/17 07/12/17





 23:59 23:59 23:59 23:59


 


Intake Total   3250 1950


 


Output Total   200 


 


Balance   3050 1950


 


Weight  245 lb 2.464 oz 245 lb 2.464 oz 243 lb 11.2 oz








Gen:  NAD in chair


Heart:  RRR


Lung: decreased breath sounds at the bases


Abd: soft, mild TTP periumbilical region, no rebound/guarding


Ext: no edema





 CBC, BMP





 07/12/17 05:20 





 07/12/17 05:20 





Active Medications





Acetaminophen (Ofirmev Injection -)  1,000 mg IVPB Q6H PRN


   PRN Reason: FEVER OR PAIN


   Stop: 07/13/17 04:28


Enoxaparin Sodium (Lovenox -)  40 mg SQ DAILY [facility]


   Last Admin: 07/12/17 09:05 Dose:  40 mg


Metronidazole (Flagyl 500mg Premixed Ivpb -)  100 mls @ 100 mls/hr IVPB Q8H-IV 

[facility]


   Last Admin: 07/12/17 09:03 Dose:  100 mls/hr


Levofloxacin (Levaquin 500 Mg Premixed Ivpb -)  100 mls @ 100 mls/hr IVPB DAILY 

[facility]


   Last Admin: 07/12/17 09:30 Dose:  100 mls/hr


Dextrose/Sodium Chloride (Dextrose 5%-Normal Saline+20 Meq Kcl -)  1,000 mls @ 

100 mls/hr IV ASDIR [facility]


Metoprolol Tartrate (Lopressor Injection -)  5 mg IVPUSH Q4H PRN


   PRN Reason: HYPERTENSION


Morphine Sulfate (Morphine Injection -)  2 mg IVPUSH Q3H PRN


   PRN Reason: PAIN


Ondansetron HCl (Zofran Injection)  8 mg IVPUSH Q6H PRN


   PRN Reason: NAUSEA AND/OR VOMITING


Pantoprazole Sodium (Protonix 40mg Ivpb (Pre-Docked))  40 mg IVPB DAILY [facility]


   Last Admin: 07/12/17 09:05 Dose:  40 mg








ASSESSMENT AND PLAN:


Abdominal Pain


r/o Diverticulitis


HTN


Hypothyroidism


Mitral Valve Prolapse





-  continue empiric antibiotics


-  f/u cultures


-  serial abdominal exams


-  pain control


-  antiemetics


-  incentive spirometry


-  NPO


-  IVF, change to D51/2NS with KCl


-  DVT prophylaxis

## 2017-07-13 LAB
ALBUMIN SERPL-MCNC: 2.8 G/DL (ref 3.4–5)
ALP SERPL-CCNC: 159 U/L (ref 45–117)
ALT SERPL-CCNC: 46 U/L (ref 12–78)
ANION GAP SERPL CALC-SCNC: 8 MMOL/L (ref 8–16)
AST SERPL-CCNC: 15 U/L (ref 15–37)
BASOPHILS # BLD: 0.3 % (ref 0–2)
BILIRUB SERPL-MCNC: 0.4 MG/DL (ref 0.2–1)
CALCIUM SERPL-MCNC: 8.5 MG/DL (ref 8.5–10.1)
CO2 SERPL-SCNC: 27 MMOL/L (ref 21–32)
CREAT SERPL-MCNC: 0.6 MG/DL (ref 0.55–1.02)
DEPRECATED RDW RBC AUTO: 13.7 % (ref 11.6–15.6)
EOSINOPHIL # BLD: 2.3 % (ref 0–4.5)
GLUCOSE SERPL-MCNC: 102 MG/DL (ref 74–106)
MCH RBC QN AUTO: 28.4 PG (ref 25.7–33.7)
MCHC RBC AUTO-ENTMCNC: 32.8 G/DL (ref 32–36)
MCV RBC: 86.6 FL (ref 80–96)
NEUTROPHILS # BLD: 80.8 % (ref 42.8–82.8)
PLATELET # BLD AUTO: 317 K/MM3 (ref 134–434)
PMV BLD: 7.6 FL (ref 7.5–11.1)
PROT SERPL-MCNC: 5.8 G/DL (ref 6.4–8.2)
WBC # BLD AUTO: 8.6 K/MM3 (ref 4–10)

## 2017-07-13 RX ADMIN — POTASSIUM CHLORIDE, DEXTROSE MONOHYDRATE AND SODIUM CHLORIDE SCH: 150; 5; 900 INJECTION, SOLUTION INTRAVENOUS at 11:44

## 2017-07-13 RX ADMIN — METRONIDAZOLE SCH MLS/HR: 500 INJECTION, SOLUTION INTRAVENOUS at 17:10

## 2017-07-13 RX ADMIN — LEVOTHYROXINE SODIUM ANHYDROUS SCH MCG: 500 INJECTION, POWDER, LYOPHILIZED, FOR SOLUTION INTRAVENOUS at 09:27

## 2017-07-13 RX ADMIN — POTASSIUM CHLORIDE, DEXTROSE MONOHYDRATE AND SODIUM CHLORIDE SCH MLS/HR: 150; 5; 900 INJECTION, SOLUTION INTRAVENOUS at 03:00

## 2017-07-13 RX ADMIN — PANTOPRAZOLE SODIUM SCH MG: 40 INJECTION, POWDER, FOR SOLUTION INTRAVENOUS at 11:43

## 2017-07-13 RX ADMIN — METRONIDAZOLE SCH MLS/HR: 500 INJECTION, SOLUTION INTRAVENOUS at 01:30

## 2017-07-13 RX ADMIN — POTASSIUM CHLORIDE, DEXTROSE MONOHYDRATE AND SODIUM CHLORIDE SCH MLS/HR: 150; 5; 900 INJECTION, SOLUTION INTRAVENOUS at 16:03

## 2017-07-13 RX ADMIN — ENOXAPARIN SODIUM SCH MG: 40 INJECTION SUBCUTANEOUS at 09:27

## 2017-07-13 RX ADMIN — ONDANSETRON PRN MG: 2 INJECTION INTRAMUSCULAR; INTRAVENOUS at 05:50

## 2017-07-13 RX ADMIN — METRONIDAZOLE SCH MLS/HR: 500 INJECTION, SOLUTION INTRAVENOUS at 10:31

## 2017-07-13 RX ADMIN — LEVOFLOXACIN SCH MLS/HR: 5 INJECTION, SOLUTION INTRAVENOUS at 09:33

## 2017-07-13 NOTE — PN
GI Progress Note


Subjective: 


GASTROENTEROLOGY





FEELS MUCH BETTER


HAD TWO SMALL BM TODAY


PASSED A LOT OF FLATUS


WBC NOW NORMAL


STILL NPO


SEEN BY SURGERY





- Objective


Vital Signs: 


 Vital Signs











Temperature  98 F   07/13/17 15:00


 


Pulse Rate  70   07/13/17 15:00


 


Respiratory Rate  18   07/13/17 15:00


 


Blood Pressure  142/89   07/13/17 15:00


 


O2 Sat by Pulse Oximetry (%)  99   07/13/17 08:26











Constitutional: No Distress, Obese


Eyes: Yes: Conjunctiva Clear


HENT: Yes: Normocephalic


Cardiovascular: Yes: Regular Rate and Rhythm


Respiratory: Yes: Regular


Gastrointestinal Inspection: Yes: WNL


...Auscultate: Yes: Hypoactive Bowel Sounds


...Palpate: Yes: Soft


Extremities: Yes: WNL


Neurological: Yes: Alert, Oriented


Labs: 


 CBC, BMP





 07/13/17 05:10 





 07/13/17 05:10 











Problem List





- Problems


(1) Diverticulitis of colon with perforation


Assessment/Plan: 


CONTINUE SAME


CK ESR/CRP IN AM


FOR CT ENTEROGRAPHY TOMORROW IF BETTER START DIET








ICU TIME: 30 MINUTES


Code(s): K57.20 - DVTRCLI OF LG INT W PERFORATION AND ABSCESS W/O BLEEDING   





(2) Enteritis


Code(s): K52.9 - NONINFECTIVE GASTROENTERITIS AND COLITIS, UNSPECIFIED





(3) Diverticulosis


Code(s): K57.90 - DVRTCLOS OF INTEST, PART UNSP, W/O PERF OR ABSCESS W/O BLEED 

  





(4) Abdominal pain


Code(s): R10.9 - UNSPECIFIED ABDOMINAL PAIN   Qualifiers: 


     Abdominal location: periumbilical     Qualified Code(s): R10.33 - 

Periumbilical pain  





(5) Other specified hypothyroidism


Code(s): E03.8 - OTHER SPECIFIED HYPOTHYROIDISM

## 2017-07-13 NOTE — PN
Progress Note (short form)





- Note


Progress Note: 


No acute events


Pain improved


NPO


+BM


 Vital Signs











 Period  Temp  Pulse  Resp  BP Sys/Collazo  Pulse Ox


 


 Last 24 Hr  98.3 F-99.0 F  62-95  14-24  126-149/80-92  99








ABd soft, minimal tenderness, no rebound





 CBC, BMP





 07/13/17 05:10 





 07/13/17 05:10 





Antibiotics


IV fluids


Possible repeat CT- if improved clears





Problem List





- Problems


(1) Abdominal pain


Code(s): R10.9 - UNSPECIFIED ABDOMINAL PAIN   Qualifiers: 


     Abdominal location: periumbilical     Qualified Code(s): R10.33 - 

Periumbilical pain

## 2017-07-13 NOTE — PN
Progress Note, Physician


Chief Complaint: 


abdominal pain nausea and vomiting


History of Present Illness: 


51yow with PMHx of HTN, hypothyroidim, mitral valve prolapse, s/p 

cholecystectomy in distant past who presented to the ED c/satya approximately 5 

days of waxing and waning periumbilical pain.has weakness,nausea pain severe 

requiring pain medication 





- Current Medication List


Current Medications: 


Active Medications





Acetaminophen (Ofirmev Injection -)  1,000 mg IVPB Q6H PRN


   PRN Reason: FEVER OR PAIN


   Stop: 07/13/17 04:28


   Last Admin: 07/12/17 13:40 Dose:  1,000 mg


Enoxaparin Sodium (Lovenox -)  40 mg SQ DAILY [facility]


   Last Admin: 07/12/17 09:05 Dose:  40 mg


Metronidazole (Flagyl 500mg Premixed Ivpb -)  100 mls @ 100 mls/hr IVPB Q8H-IV 

[facility]


   Last Admin: 07/12/17 18:27 Dose:  100 mls/hr


Levofloxacin (Levaquin 500 Mg Premixed Ivpb -)  100 mls @ 100 mls/hr IVPB DAILY 

[facility]


   Last Admin: 07/12/17 09:30 Dose:  100 mls/hr


Dextrose/Sodium Chloride (Dextrose 5%-Normal Saline+20 Meq Kcl -)  1,000 mls @ 

100 mls/hr IV ASDIR [facility]


   Last Admin: 07/12/17 11:10 Dose:  100 mls/hr


Levothyroxine Sodium (Synthroid Injection -)  50 mcg IVPUSH DAILY [facility]


Metoprolol Tartrate (Lopressor Injection -)  5 mg IVPUSH Q4H PRN


   PRN Reason: HYPERTENSION


Morphine Sulfate (Morphine Injection -)  2 mg IVPUSH Q3H PRN


   PRN Reason: PAIN


   Last Admin: 07/12/17 11:04 Dose:  2 mg


Ondansetron HCl (Zofran Injection)  8 mg IVPUSH Q6H PRN


   PRN Reason: NAUSEA AND/OR VOMITING


   Last Admin: 07/12/17 18:26 Dose:  8 mg


Pantoprazole Sodium (Protonix 40mg Ivpb (Pre-Docked))  40 mg IVPB DAILY [facility]


   Last Admin: 07/12/17 09:05 Dose:  40 mg











- Objective


Vital Signs: 


 Vital Signs











Temperature  98.7 F   07/12/17 22:00


 


Pulse Rate  82   07/13/17 00:00


 


Respiratory Rate  24   07/13/17 00:00


 


Blood Pressure  144/83   07/12/17 22:00


 


O2 Sat by Pulse Oximetry (%)  99   07/11/17 20:24











Constitutional: Yes: Well Nourished


Eyes: Yes: EOM Intact


HENT: Yes: Normocephalic


Neck: Yes: Trachea Midline


Cardiovascular: Yes: Regular Rate and Rhythm


Respiratory: Yes: CTA Bilaterally


Gastrointestinal: Yes: Normal Bowel Sounds, Abdomen, Obese, Hypoactive Bowel 

Sounds


...Rectal Exam: Yes: Deferred


Genitourinary: Yes: WNL


Breast(s): Yes: WNL


Musculoskeletal: Yes: WNL


Extremities: Yes: WNL


Edema: No


Peripheral Pulses WNL: Yes


Labs: 


 CBC, BMP





 07/12/17 05:20 





 07/12/17 05:20 











Problem List





- Problems


(1) Abdominal pain


Code(s): R10.9 - UNSPECIFIED ABDOMINAL PAIN   Qualifiers: 


     Abdominal location: periumbilical     Qualified Code(s): R10.33 - 

Periumbilical pain  





(2) Acute diverticulitis


Code(s): K57.92 - DVTRCLI OF INTEST, PART UNSP, W/O PERF OR ABSCESS W/O BLEED





(3) Colitis


Code(s): K52.9 - NONINFECTIVE GASTROENTERITIS AND COLITIS, UNSPECIFIED





(4) Other specified hypothyroidism


Code(s): E03.8 - OTHER SPECIFIED HYPOTHYROIDISM








Assessment/Plan


Current Active Problems





Abdominal pain (Acute) 


Acute diverticulitis (Acute) 


Colitis (Acute) 


Diverticulitis large intestine (Acute) 


Diverticulitis of colon with perforation (Acute) 


Diverticulosis (Acute) 


Enteritis (Acute) 


Other specified hypothyroidism (Acute) 


Small bowel obstruction (Acute) 


Status post cholecystectomy (Acute) 





hypothyroidism hashimoto


 Abnormal Lab Results











  07/12/17 07/12/17 07/12/17





  05:20 05:20 05:20


 


WBC   10.3 H 


 


Neutrophils %   87.3 H 


 


Lymphocytes %   7.8 L D 


 


Monocytes %   3.7 L 


 


ESR   


 


BUN  5 L D  


 


Creatinine  0.5 L  


 


Calcium  8.1 L  


 


Alkaline Phosphatase  146 H  


 


C-Reactive Protein    17.2 H


 


Total Protein  5.6 L  


 


Albumin  2.7 L D  


 


TSH  4.06 H D  














  07/12/17





  05:20


 


WBC 


 


Neutrophils % 


 


Lymphocytes % 


 


Monocytes % 


 


ESR  45 H


 


BUN 


 


Creatinine 


 


Calcium 


 


Alkaline Phosphatase 


 


C-Reactive Protein 


 


Total Protein 


 


Albumin 


 


TSH 








plan:


iv synthroid till po tolerated


 Current Medications











Generic Name Dose Route Start Last Admin





  Trade Name Ella  PRN Reason Stop Dose Admin


 


Acetaminophen  1,000 mg 07/12/17 10:27 07/12/17 13:40





  Ofirmev Injection -  IVPB 07/13/17 04:28  1,000 mg





  Q6H PRN   Administration





  FEVER OR PAIN   


 


Enoxaparin Sodium  40 mg 07/11/17 11:15 07/12/17 09:05





  Lovenox -  SQ   40 mg





  DAILY [facility]   Administration


 


Metronidazole  100 mls @ 100 mls/hr 07/11/17 10:00 07/12/17 18:27





  Flagyl 500mg Premixed Ivpb -  IVPB   100 mls/hr





  Q8H-IV [facility]   Administration


 


Levofloxacin  100 mls @ 100 mls/hr 07/11/17 10:00 07/12/17 09:30





  Levaquin 500 Mg Premixed Ivpb -  IVPB   100 mls/hr





  DAILY [facility]   Administration


 


Dextrose/Sodium Chloride  1,000 mls @ 100 mls/hr 07/12/17 10:30 07/12/17 11:10





  Dextrose 5%-Normal Saline+20 Meq Kcl -  IV   100 mls/hr





  ASDIR [facility]   Administration


 


Levothyroxine Sodium  50 mcg 07/13/17 00:23  





  Synthroid Injection -  IVPUSH   





  DAILY [facility]   


 


Metoprolol Tartrate  5 mg 07/11/17 03:09  





  Lopressor Injection -  IVPUSH   





  Q4H PRN   





  HYPERTENSION   


 


Morphine Sulfate  2 mg 07/12/17 10:26 07/12/17 11:04





  Morphine Injection -  IVPUSH   2 mg





  Q3H PRN   Administration





  PAIN   


 


Ondansetron HCl  8 mg 07/12/17 10:27 07/12/17 18:26





  Zofran Injection  IVPUSH   8 mg





  Q6H PRN   Administration





  NAUSEA AND/OR VOMITING   


 


Pantoprazole Sodium  40 mg 07/11/17 10:00 07/12/17 09:05





  Protonix 40mg Ivpb (Pre-Docked)  IVPB   40 mg





  DAILY [facility]   Administration

## 2017-07-13 NOTE — PN
Progress Note, Physician


History of Present Illness: 


4th day on flagyl and levofloxacin still NPO. White count back to Normal, 

neutrophilia down.


Has not had any pain meds today (acetaminophen script exhausted, LFTs show high 

alk phosphate but normal AST and ALT), 


has dull periumbilical pain which is improving


Had 2 episodes of vomiting from yesterday, one last night and one this morning, 

with associated retching, yellowish, non bloody 


Had a bowel movement of loose brown stool this am, no pus, non bloody 


Able to get in and out of bed with some pain but ambulating


Last dose of narcotics was yesterday, no fevers or chills





- Current Medication List


Current Medications: 


Active Medications





Enoxaparin Sodium (Lovenox -)  40 mg SQ DAILY [facility]


   Last Admin: 07/12/17 09:05 Dose:  40 mg


Metronidazole (Flagyl 500mg Premixed Ivpb -)  100 mls @ 100 mls/hr IVPB Q8H-IV 

[facility]


   Last Admin: 07/13/17 01:30 Dose:  100 mls/hr


Levofloxacin (Levaquin 500 Mg Premixed Ivpb -)  100 mls @ 100 mls/hr IVPB DAILY 

[facility]


   Last Admin: 07/12/17 09:30 Dose:  100 mls/hr


Dextrose/Sodium Chloride (Dextrose 5%-Normal Saline+20 Meq Kcl -)  1,000 mls @ 

100 mls/hr IV ASDIR [facility]


   Last Admin: 07/13/17 03:00 Dose:  100 mls/hr


Levothyroxine Sodium (Synthroid Injection -)  50 mcg IVPUSH DAILY [facility]


Metoprolol Tartrate (Lopressor Injection -)  5 mg IVPUSH Q4H PRN


   PRN Reason: HYPERTENSION


Morphine Sulfate (Morphine Injection -)  2 mg IVPUSH Q3H PRN


   PRN Reason: PAIN


   Last Admin: 07/12/17 11:04 Dose:  2 mg


Ondansetron HCl (Zofran Injection)  8 mg IVPUSH Q6H PRN


   PRN Reason: NAUSEA AND/OR VOMITING


   Last Admin: 07/13/17 05:50 Dose:  8 mg


Pantoprazole Sodium (Protonix 40mg Ivpb (Pre-Docked))  40 mg IVPB DAILY [facility]


   Last Admin: 07/12/17 09:05 Dose:  40 mg











- Objective


Vital Signs: 


 Vital Signs











Temperature  98.6 F   07/13/17 06:00


 


Pulse Rate  78   07/13/17 08:00


 


Respiratory Rate  18   07/13/17 08:00


 


Blood Pressure  147/92   07/13/17 08:00


 


O2 Sat by Pulse Oximetry (%)  99   07/13/17 08:26











Constitutional: Yes: Calm.  No: Diaphoresis, Pallor


Eyes: Yes: Conjunctiva Clear, EOM Intact


HENT: Yes: Atraumatic.  No: Hoarseness, Pharyngeal Erythema, Rhinnorhea, Thrush


Neck: Yes: Supple.  No: Rigid, Tenderness


Cardiovascular: Yes: Regular Rate and Rhythm, S1, S2


Respiratory: No: On Nasal O2, Rales, Rhonchi, SOB, Tachypnea, Wheezes


Gastrointestinal: Yes: Abdomen, Obese, Hypoactive Bowel Sounds (Bowel sounds 

present), Vomiting (One episode of vomitting last night, repeated retching 

overnight, then a second episode of yellowish vomit this am. Non bloody.).  No: 

Palpable Mass, Tenderness, Rebound (Mild Periumbilical and suprapubic tendernes)


...Rectal Exam: Yes: Deferred


Genitourinary: No: Ascencio Present


Musculoskeletal: No: Joint Stiffness, Joint Swelling


Edema: No


Neurological: Yes: Alert, Oriented.  No: Confusion


Labs: 


 CBC, BMP





 07/13/17 05:10 





 07/13/17 05:10 











Problem List





- Problems


(1) Acute diverticulitis


Assessment/Plan: 


Acute diverticulitis with rupture and localized pnuemoperitoneum


 R/O Merkels diverticulum


Code(s): K57.92 - DVTRCLI OF INTEST, PART UNSP, W/O PERF OR ABSCESS W/O BLEED





(2) Small bowel obstruction


Assessment/Plan: 


Inflammed small bowel on imaging , some improvement, passed loose stool today


Code(s): K56.69 - OTHER INTESTINAL OBSTRUCTION





(3) Status post cholecystectomy


Code(s): Z90.49 - ACQUIRED ABSENCE OF OTHER SPECIFIED PARTS OF DIGESTIVE TRACT








Impression/Plan


Impression/Plan: 


remaining afebrile, dropping WBC, 


Preliminary blood culture results shows no growth


Had an episode of loose brown stool this am,but vomited twice since last night 





Plan: 


Continue levofloxacin and flagyl








Visit type





- Emergency Visit


Emergency Visit: No





- New Patient


This patient is new to me today: No





- Critical Care


Critical Care patient: No





- Discharge Referral


Referred to [facility] No

## 2017-07-13 NOTE — PN
Physical Exam: 


SUBJECTIVE: Patient seen and examined at bedside in ICU. Pt feels better today 

after her last BM this morning. BM was diarrheal in nature but with no blood. 

Pt vomited yesterday which was yellow with no blood. She states she had a 

copious amount of vomiting yesterday. She still has pain in periumbilical 

region which is worse with movement and rated as a 3/10 when moving. Denies 

fevers, chills, chest pain, sob.








OBJECTIVE:





 


 Vital Signs











Temperature  98.6 F   07/13/17 06:00


 


Pulse Rate  80   07/13/17 08:35


 


Respiratory Rate  18   07/13/17 08:35


 


Blood Pressure  137/90   07/13/17 08:35


 


O2 Sat by Pulse Oximetry (%)  99   07/13/17 08:26








GENERAL: The patient is awake, alert, and fully oriented, in no acute distress.


HEAD: Normal with no signs of trauma.


EYES: extraocular movements intact, sclera anicteric, conjunctiva clear. No 

ptosis. 


ENT: Ears normal, nares patent


NECK: Trachea midline 


LUNGS: Breath sounds equal, clear to auscultation bilaterally, no wheezes


HEART: Regular rate and rhythm, S1, S2 without murmur, rub or gallop.


ABDOMEN: +periumbilical tenderness, Soft, nondistended, normoactive bowel sounds


EXTREMITIES: warm, well-perfused 


NEUROLOGICAL: Cranial nerves II through XII grossly intact. Normal speech, 

normal gait.


PSYCH: Normal mood, normal affect.


SKIN: Warm, dry

















 Laboratory Results - last 24 hr











  07/12/17 07/13/17 07/13/17





  05:20 05:10 05:10


 


WBC   8.6 


 


RBC   3.83 


 


Hgb   10.9 


 


Hct   33.2 


 


MCV   86.6 


 


MCH   28.4 


 


MCHC   32.8 


 


RDW   13.7 


 


Plt Count   317 


 


MPV   7.6 


 


Neutrophils %   80.8 


 


Lymphocytes %   12.6  D 


 


Monocytes %   4.0 


 


Eosinophils %   2.3  D 


 


Basophils %   0.3 


 


Sodium  142   143


 


Potassium  3.8   3.7


 


Chloride  107   108 H


 


Carbon Dioxide  25   27


 


Anion Gap  10   8


 


BUN  5 L D   5 L


 


Creatinine  0.5 L   0.6


 


Creat Clearance w eGFR  > 60   > 60


 


Random Glucose  95   102


 


Calcium  8.1 L   8.5


 


Total Bilirubin  0.9  D   0.4  D


 


AST  37  D   15  D


 


ALT  59   46  D


 


Alkaline Phosphatase  146 H   159 H


 


Total Protein  5.6 L   5.8 L


 


Albumin  2.7 L D   2.8 L


 


TSH  4.06 H D  


 


Free T4  1.15  D  








 Microbiology





07/11/17 15:10   Blood - Peripheral Venous   Blood Culture - Preliminary


                            NO GROWTH OBTAINED AFTER 24 HOURS, INCUBATION TO 

CONTINUE


                            FOR 4 DAYS.


07/11/17 15:20   Blood - Peripheral Venous   Blood Culture - Preliminary


                            NO GROWTH OBTAINED AFTER 24 HOURS, INCUBATION TO 

CONTINUE


                            FOR 4 DAYS.














Active Medications











Generic Name Dose Route Start Last Admin





  Trade Name Ella  PRN Reason Stop Dose Admin


 


Acetaminophen  1,000 mg 07/13/17 08:48  





  Ofirmev Injection -  IVPB 07/14/17 02:49  





  Q6H PRN   





  FEVER OR PAIN   


 


Enoxaparin Sodium  40 mg 07/11/17 11:15 07/12/17 09:05





  Lovenox -  SQ   40 mg





  DAILY GARRY   Administration


 


Metronidazole  100 mls @ 100 mls/hr 07/11/17 10:00 07/13/17 01:30





  Flagyl 500mg Premixed Ivpb -  IVPB   100 mls/hr





  Q8H-IV [facility]   Administration


 


Levofloxacin  100 mls @ 100 mls/hr 07/11/17 10:00 07/12/17 09:30





  Levaquin 500 Mg Premixed Ivpb -  IVPB   100 mls/hr





  DAILY GARRY   Administration


 


Dextrose/Sodium Chloride  1,000 mls @ 100 mls/hr 07/12/17 10:30 07/13/17 03:00





  Dextrose 5%-Normal Saline+20 Meq Kcl -  IV   100 mls/hr





  ASDIR [facility]   Administration


 


Levothyroxine Sodium  50 mcg 07/13/17 00:23  





  Synthroid Injection -  IVPUSH   





  DAILY GARRY   


 


Metoprolol Tartrate  5 mg 07/11/17 03:09  





  Lopressor Injection -  IVPUSH   





  Q4H PRN   





  HYPERTENSION   


 


Morphine Sulfate  2 mg 07/12/17 10:26 07/12/17 11:04





  Morphine Injection -  IVPUSH   2 mg





  Q3H PRN   Administration





  PAIN   


 


Ondansetron HCl  8 mg 07/12/17 10:27 07/13/17 05:50





  Zofran Injection  IVPUSH   8 mg





  Q6H PRN   Administration





  NAUSEA AND/OR VOMITING   


 


Pantoprazole Sodium  40 mg 07/11/17 10:00 07/12/17 09:05





  Protonix 40mg Ivpb (Pre-Docked)  IVPB   40 mg





  DAILY GARRY   Administration











ASSESSMENT/PLAN:


50 y/o F with PMH of HTN, hypothyroidism presented to ER with 10/10 mid 

abdominal pain with sudden onset. Admitted to ICU for diverticulitis w/

perforation.





Neuro:


Mental status intact





Abd:


Abdominal pain likely secondary to diverticulitis w/perforation


   -Repeat CT scan will need to be considered.


   -Surgery on board, ID on board, GI on board


   -f/u C. Diff, stool cultures


   -c/w levaquin and flagyl


   -pain control with Ofirmev 1g IV q6h PRN, morphine 2mg IV q3h PRN


   -D5-1/2NS +20meq KCl @ 100 ml/hr


   -Protonix 40 mg IVPB qd


   -Zofran 8mg IV q6h PRN for nausea





Endocrine


Hypothyroidism


   -synthroid 50 mcg daily.





Cardio


HTN


   -c/w lopressor 5 mg IV q4h prn for htn





Prophylaxis


   -DVT: lovenox 40 mg sq qd


   -GI: Protonix 40 mg IV qd





FEN


   -D5-1/2 NS + 20meq KCl @ 100ml/hr


   -Monitor electrolytes, replete as necessary


   -NPO





Dispo:


continue to monitor in ICU





Problem List





- Problems


(1) Abdominal pain


Code(s): R10.9 - UNSPECIFIED ABDOMINAL PAIN   Qualifiers: 


     Abdominal location: periumbilical     Qualified Code(s): R10.33 - 

Periumbilical pain  





(2) Acute diverticulitis


Code(s): K57.92 - DVTRCLI OF INTEST, PART UNSP, W/O PERF OR ABSCESS W/O BLEED





(3) Colitis


Code(s): K52.9 - NONINFECTIVE GASTROENTERITIS AND COLITIS, UNSPECIFIED





(4) Other specified hypothyroidism


Code(s): E03.8 - OTHER SPECIFIED HYPOTHYROIDISM








Visit type





- Emergency Visit


Emergency Visit: Yes


ED Registration Date: 07/10/17


Care time: The patient presented to the Emergency Department on the above date 

and was hospitalized for further evaluation of their emergent condition.





- New Patient


This patient is new to me today: No





- Critical Care


Critical Care patient: Yes


Total Critical Care Time (in minutes): 35


Critical Care Statement: The care of this patient involved high complexity 

decision making to prevent further life threatening deterioration of the patient

's condition and/or to evalute & treat vital organ system(s) failure or risk of 

failure.

## 2017-07-13 NOTE — PN
Teaching Attending Note


Name of Resident: Leslie Sahni


ATTENDING PHYSICIAN STATEMENT





I saw and evaluated the patient.


I reviewed the resident's note and discussed the case with the resident.


I agree with the resident's findings and plan as documented.








SUBJECTIVE:


OOB in chair


No c/o abdominal pain


+ loose, non-bloody stool


No N/V


Afebrile   WBC WNL








OBJECTIVE:


Cor S1S2


Lungs clear


Abdo soft, non tender








ASSESSMENT AND PLAN:


Probable acute  complicated diverticulitis- improved


   Clinically improved


   Continue empiric levaquin/ flagyl

## 2017-07-13 NOTE — PN
Teaching Attending Note


Name of Resident: Blas Wagner


ATTENDING PHYSICIAN STATEMENT





I saw and evaluated the patient.


I reviewed the resident's note and discussed the case with the resident.


I agree with the resident's findings and plan as documented.








SUBJECTIVE:





Pt seen and examined in the ICU. Abdominal pain improving. Had loose BM this 

AM. No fevers or chills. No nausea or vomiting.





OBJECTIVE:





 Last Vital Signs











Temp Pulse Resp BP Pulse Ox


 


 98.3 F   72   18   136/90   99 


 


 07/13/17 10:00  07/13/17 10:00  07/13/17 10:00  07/13/17 10:00  07/13/17 08:26








 Intake & Output











 07/10/17 07/11/17 07/12/17 07/13/17





 23:59 23:59 23:59 23:59


 


Intake Total  3250 3250 1200


 


Output Total  200 225 


 


Balance  3050 3025 1200


 


Weight 245 lb 2.464 oz 245 lb 2.464 oz 243 lb 11.2 oz 243 lb 1.6 oz








Gen:  NAD at rest


Heart:  RRR


Lung: decreased breath sounds at the bases


Abd: soft, mild TTP periumbilical, no rebound


Ext: no edema





 CBC, BMP





 07/13/17 05:10 





 07/13/17 05:10 





Active Medications





Acetaminophen (Ofirmev Injection -)  1,000 mg IVPB Q6H PRN


   PRN Reason: FEVER OR PAIN


   Stop: 07/14/17 02:49


   Last Admin: 07/13/17 09:04 Dose:  1,000 mg


Enoxaparin Sodium (Lovenox -)  40 mg SQ DAILY [facility]


   Last Admin: 07/13/17 09:27 Dose:  40 mg


Metronidazole (Flagyl 500mg Premixed Ivpb -)  100 mls @ 100 mls/hr IVPB Q8H-IV 

[facility]


   Last Admin: 07/13/17 10:31 Dose:  100 mls/hr


Levofloxacin (Levaquin 500 Mg Premixed Ivpb -)  100 mls @ 100 mls/hr IVPB DAILY 

[facility]


   Last Admin: 07/13/17 09:33 Dose:  100 mls/hr


Dextrose/Sodium Chloride (Dextrose 5%-Normal Saline+20 Meq Kcl -)  1,000 mls @ 

100 mls/hr IV ASDIR [facility]


   Last Admin: 07/13/17 03:00 Dose:  100 mls/hr


Levothyroxine Sodium (Synthroid Injection -)  50 mcg IVPUSH DAILY [facility]


   Last Admin: 07/13/17 09:27 Dose:  50 mcg


Metoprolol Tartrate (Lopressor Injection -)  5 mg IVPUSH Q4H PRN


   PRN Reason: HYPERTENSION


Morphine Sulfate (Morphine Injection -)  2 mg IVPUSH Q3H PRN


   PRN Reason: PAIN


   Last Admin: 07/12/17 11:04 Dose:  2 mg


Ondansetron HCl (Zofran Injection)  8 mg IVPUSH Q6H PRN


   PRN Reason: NAUSEA AND/OR VOMITING


   Last Admin: 07/13/17 05:50 Dose:  8 mg


Pantoprazole Sodium (Protonix 40mg Ivpb (Pre-Docked))  40 mg IVPB DAILY [facility]


   Last Admin: 07/12/17 09:05 Dose:  40 mg








ASSESSMENT AND PLAN:


Abdominal Pain


r/o Diverticulitis


HTN


Hypothyroidism


Mitral Valve Prolapse





-  continue empiric antibiotics


-  f/u cultures


-  serial abdominal exams


-  pain control


-  antiemetics


-  incentive spirometry


-  NPO


-  continue IVF


-  repeat imaging per GI/surgery


-  DVT prophylaxis

## 2017-07-13 NOTE — PN
Progress Note, Physician


Chief Complaint: 


Abdominal pain


History of Present Illness: 


Mild discomfort this am in hypogastric/suprapubic region, mild nausea after 

receiving morphine for pain, felt better after receiving zofran IVPB, has 

family hx of diverticulosis on mothers side. No previous episode. No 

colonoscopy in the past. 


Had solid BM this AM-likely no cdiff present, +flatulence, seen by surgery, no 

sx recommended at this time. NPO.





- Current Medication List


Current Medications: 


Active Medications





Acetaminophen (Ofirmev Injection -)  1,000 mg IVPB Q6H PRN


   PRN Reason: FEVER OR PAIN


   Stop: 07/14/17 02:49


   Last Admin: 07/13/17 09:04 Dose:  1,000 mg


Enoxaparin Sodium (Lovenox -)  40 mg SQ DAILY [facility]


   Last Admin: 07/13/17 09:27 Dose:  40 mg


Metronidazole (Flagyl 500mg Premixed Ivpb -)  100 mls @ 100 mls/hr IVPB Q8H-IV 

[facility]


   Last Admin: 07/13/17 01:30 Dose:  100 mls/hr


Levofloxacin (Levaquin 500 Mg Premixed Ivpb -)  100 mls @ 100 mls/hr IVPB DAILY 

[facility]


   Last Admin: 07/13/17 09:33 Dose:  100 mls/hr


Dextrose/Sodium Chloride (Dextrose 5%-Normal Saline+20 Meq Kcl -)  1,000 mls @ 

100 mls/hr IV ASDIR [facility]


   Last Admin: 07/13/17 03:00 Dose:  100 mls/hr


Levothyroxine Sodium (Synthroid Injection -)  50 mcg IVPUSH DAILY [facility]


   Last Admin: 07/13/17 09:27 Dose:  50 mcg


Metoprolol Tartrate (Lopressor Injection -)  5 mg IVPUSH Q4H PRN


   PRN Reason: HYPERTENSION


Morphine Sulfate (Morphine Injection -)  2 mg IVPUSH Q3H PRN


   PRN Reason: PAIN


   Last Admin: 07/12/17 11:04 Dose:  2 mg


Ondansetron HCl (Zofran Injection)  8 mg IVPUSH Q6H PRN


   PRN Reason: NAUSEA AND/OR VOMITING


   Last Admin: 07/13/17 05:50 Dose:  8 mg


Pantoprazole Sodium (Protonix 40mg Ivpb (Pre-Docked))  40 mg IVPB DAILY [facility]


   Last Admin: 07/12/17 09:05 Dose:  40 mg











- Objective


Vital Signs: 


 Vital Signs











Temperature  98.3 F   07/13/17 10:00


 


Pulse Rate  72   07/13/17 10:00


 


Respiratory Rate  18   07/13/17 10:00


 


Blood Pressure  136/90   07/13/17 10:00


 


O2 Sat by Pulse Oximetry (%)  99   07/13/17 08:26











Constitutional: Yes: Well Nourished, No Distress, Calm


Cardiovascular: Yes: Regular Rate and Rhythm


Respiratory: Yes: Regular


Gastrointestinal: Yes: Hyperactive Bowel Sounds, Tenderness (periumbilical and 

suprapubic region)


Musculoskeletal: Yes: WNL


Extremities: Yes: WNL


Edema: No


Peripheral Pulses WNL: Yes


Neurological: Yes: Alert, Oriented


Psychiatric: Yes: Alert, Oriented


Labs: 


 CBC, BMP





 07/13/17 05:10 





 07/13/17 05:10 











Problem List





- Problems


(1) Abdominal pain


Assessment/Plan: 


-pain management


-NPO


-IVF


-IV abx


-had loose BM today, unable to obtain the sample


-vomitus x 2 today, bile components, feels better after


Code(s): R10.9 - UNSPECIFIED ABDOMINAL PAIN   Qualifiers: 


        Qualified Code(s): R10.33 - Periumbilical pain  





(2) Acute diverticulitis


Assessment/Plan: 


-seen by GI and surgery, no intervention recommended at this time.


Code(s): K57.92 - DVTRCLI OF INTEST, PART UNSP, W/O PERF OR ABSCESS W/O BLEED





(3) Other specified hypothyroidism


Assessment/Plan: 


start IV levothyroxine, recommended dosing 50-70% of PO dose.


-on 50 mcg IV levothyroxine as per endocrinology


Code(s): E03.8 - OTHER SPECIFIED HYPOTHYROIDISM








Assessment/Plan


.


-IV abx


-IVF


-NPO


-Pain management with IV acetaminophen and morphine as needed


-Antiemetic-zofran increased


-PPI


-DVT prophylaxis


-IV levothyroxine at 50 mcg


-seen by surgery and endocrinology

## 2017-07-14 LAB
ALBUMIN SERPL-MCNC: 2.5 G/DL (ref 3.4–5)
ALP SERPL-CCNC: 146 U/L (ref 45–117)
ALT SERPL-CCNC: 34 U/L (ref 12–78)
ANION GAP SERPL CALC-SCNC: 5 MMOL/L (ref 8–16)
AST SERPL-CCNC: 15 U/L (ref 15–37)
BILIRUB SERPL-MCNC: 0.3 MG/DL (ref 0.2–1)
CALCIUM SERPL-MCNC: 8.2 MG/DL (ref 8.5–10.1)
CO2 SERPL-SCNC: 28 MMOL/L (ref 21–32)
CREAT SERPL-MCNC: 0.6 MG/DL (ref 0.55–1.02)
DEPRECATED RDW RBC AUTO: 13.5 % (ref 11.6–15.6)
GLUCOSE SERPL-MCNC: 105 MG/DL (ref 74–106)
MCH RBC QN AUTO: 28.6 PG (ref 25.7–33.7)
MCHC RBC AUTO-ENTMCNC: 32.8 G/DL (ref 32–36)
MCV RBC: 87 FL (ref 80–96)
PLATELET # BLD AUTO: 336 K/MM3 (ref 134–434)
PMV BLD: 7.1 FL (ref 7.5–11.1)
PROT SERPL-MCNC: 5.2 G/DL (ref 6.4–8.2)
WBC # BLD AUTO: 5.9 K/MM3 (ref 4–10)

## 2017-07-14 RX ADMIN — METRONIDAZOLE SCH MLS/HR: 500 INJECTION, SOLUTION INTRAVENOUS at 01:52

## 2017-07-14 RX ADMIN — METRONIDAZOLE SCH MLS/HR: 500 INJECTION, SOLUTION INTRAVENOUS at 18:00

## 2017-07-14 RX ADMIN — METRONIDAZOLE SCH MLS/HR: 500 INJECTION, SOLUTION INTRAVENOUS at 18:15

## 2017-07-14 RX ADMIN — LEVOTHYROXINE SODIUM ANHYDROUS SCH MCG: 500 INJECTION, POWDER, LYOPHILIZED, FOR SOLUTION INTRAVENOUS at 10:14

## 2017-07-14 RX ADMIN — POTASSIUM CHLORIDE, DEXTROSE MONOHYDRATE AND SODIUM CHLORIDE SCH MLS/HR: 150; 5; 900 INJECTION, SOLUTION INTRAVENOUS at 18:04

## 2017-07-14 RX ADMIN — PANTOPRAZOLE SODIUM SCH MG: 40 INJECTION, POWDER, FOR SOLUTION INTRAVENOUS at 10:11

## 2017-07-14 RX ADMIN — LEVOFLOXACIN SCH MLS/HR: 5 INJECTION, SOLUTION INTRAVENOUS at 10:10

## 2017-07-14 RX ADMIN — METRONIDAZOLE SCH MLS/HR: 500 INJECTION, SOLUTION INTRAVENOUS at 10:10

## 2017-07-14 RX ADMIN — POTASSIUM CHLORIDE, DEXTROSE MONOHYDRATE AND SODIUM CHLORIDE SCH MLS/HR: 150; 5; 900 INJECTION, SOLUTION INTRAVENOUS at 01:52

## 2017-07-14 RX ADMIN — ENOXAPARIN SODIUM SCH MG: 40 INJECTION SUBCUTANEOUS at 10:10

## 2017-07-14 NOTE — PN
Physical Exam: 


SUBJECTIVE: Patient seen and examined at bedside in ICU. Pt feels better today. 

Had solid BM today and passing flatus. Decrease in abdominal pain.








OBJECTIVE:





 


 Vital Signs











Temperature  97.6 F   07/14/17 06:00


 


Pulse Rate  74   07/14/17 06:00


 


Respiratory Rate  18   07/14/17 06:00


 


Blood Pressure  156/100   07/14/17 06:00


 


O2 Sat by Pulse Oximetry (%)  99   07/13/17 19:38














GENERAL: The patient is awake, alert, and fully oriented, in no acute distress.


HEAD: Normal with no signs of trauma.


EYES: extraocular movements intact, sclera anicteric, conjunctiva clear. No 

ptosis. 


ENT: Ears normal, nares patent


NECK: Trachea midline 


LUNGS: Breath sounds equal, clear to auscultation bilaterally, no wheezes


HEART: Regular rate and rhythm, S1, S2 without murmur, rub or gallop.


ABDOMEN: +mild periumbilical tenderness, Soft, nondistended, normoactive bowel 

sounds


EXTREMITIES: warm, well-perfused 


NEUROLOGICAL: Cranial nerves II through XII grossly intact. Normal speech, 

normal gait.


PSYCH: Normal mood, normal affect.


SKIN: Warm, dry

















 Laboratory Results - last 24 hr











  07/14/17 07/14/17 07/14/17





  05:05 05:05 05:05


 


WBC  5.9  D  


 


RBC  3.60  


 


Hgb  10.3 L  


 


Hct  31.3 L  


 


MCV  87.0  


 


MCH  28.6  


 


MCHC  32.8  


 


RDW  13.5  


 


Plt Count  336  


 


MPV  7.1 L  


 


ESR   


 


Sodium   142 


 


Potassium   3.9 


 


Chloride   109 H 


 


Carbon Dioxide   28 


 


Anion Gap   5 L 


 


BUN   5 L 


 


Creatinine   0.6 


 


Creat Clearance w eGFR   > 60 


 


Random Glucose   105 


 


Calcium   8.2 L 


 


Ferritin   


 


Total Bilirubin   0.3  D 


 


AST   15 


 


ALT   34  D 


 


Alkaline Phosphatase   146 H 


 


C-Reactive Protein    7.2 H D


 


Total Protein   5.2 L 


 


Albumin   2.5 L 














  07/14/17 07/14/17





  05:05 10:15


 


WBC  


 


RBC  


 


Hgb  


 


Hct  


 


MCV  


 


MCH  


 


MCHC  


 


RDW  


 


Plt Count  


 


MPV  


 


ESR  53 H 


 


Sodium  


 


Potassium  


 


Chloride  


 


Carbon Dioxide  


 


Anion Gap  


 


BUN  


 


Creatinine  


 


Creat Clearance w eGFR  


 


Random Glucose  


 


Calcium  


 


Ferritin   151.578


 


Total Bilirubin  


 


AST  


 


ALT  


 


Alkaline Phosphatase  


 


C-Reactive Protein  


 


Total Protein  


 


Albumin  








Active Medications











Generic Name Dose Route Start Last Admin





  Trade Name Freq  PRN Reason Stop Dose Admin


 


Acetaminophen  1,000 mg 07/14/17 04:21 07/14/17 03:40





  Ofirmev Injection -  IVPB 07/14/17 22:22  1,000 mg





  Q6H PRN   Administration





  FEVER OR PAIN   


 


Enoxaparin Sodium  40 mg 07/11/17 11:15 07/14/17 10:10





  Lovenox -  SQ   40 mg





  DAILY [facility]   Administration


 


Metronidazole  100 mls @ 100 mls/hr 07/11/17 10:00 07/14/17 10:10





  Flagyl 500mg Premixed Ivpb -  IVPB   100 mls/hr





  Q8H-IV [facility]   Administration


 


Levofloxacin  100 mls @ 100 mls/hr 07/11/17 10:00 07/14/17 10:10





  Levaquin 500 Mg Premixed Ivpb -  IVPB   100 mls/hr





  DAILY [facility]   Administration


 


Dextrose/Sodium Chloride  1,000 mls @ 100 mls/hr 07/12/17 10:30 07/14/17 01:52





  Dextrose 5%-Normal Saline+20 Meq Kcl -  IV   100 mls/hr





  ASDIR [facility]   Administration


 


Levothyroxine Sodium  50 mcg 07/13/17 00:23 07/14/17 10:14





  Synthroid Injection -  IVPUSH   50 mcg





  DAILY [facility]   Administration


 


Metoprolol Tartrate  5 mg 07/11/17 03:09  





  Lopressor Injection -  IVPUSH   





  Q4H PRN   





  HYPERTENSION   


 


Morphine Sulfate  2 mg 07/12/17 10:26 07/12/17 11:04





  Morphine Injection -  IVPUSH   2 mg





  Q3H PRN   Administration





  PAIN   


 


Ondansetron HCl  8 mg 07/12/17 10:27 07/13/17 05:50





  Zofran Injection  IVPUSH   8 mg





  Q6H PRN   Administration





  NAUSEA AND/OR VOMITING   


 


Pantoprazole Sodium  40 mg 07/11/17 10:00 07/14/17 10:11





  Protonix 40mg Ivpb (Pre-Docked)  IVPB   40 mg





  DAILY [facility]   Administration











ASSESSMENT/PLAN:


52 y/o F with PMH of HTN, hypothyroidism presented to ER with 10/10 mid 

abdominal pain with sudden onset. Admitted to ICU for diverticulitis w/

perforation.





Neuro:


Mental status intact





Abd:


Abdominal pain likely secondary to diverticulitis w/perforation


   -CT enterography today


   -Surgery on board, ID on board, GI on board


   -c/w levaquin and flagyl


   -pain control with Ofirmev 1g IV q6h PRN, morphine 2mg IV q3h PRN


   -D5-1/2NS +20meq KCl @ 100 ml/hr


   -Protonix 40 mg IVPB qd


   -Zofran 8mg IV q6h PRN for nausea





Endocrine


Hypothyroidism


   -synthroid 50 mcg daily.





Cardio


HTN


   -c/w lopressor 5 mg IV q4h prn for htn





Prophylaxis


   -DVT: lovenox 40 mg sq qd


   -GI: Protonix 40 mg IV qd





FEN


   -D5-1/2 NS + 20meq KCl @ 100ml/hr


   -Monitor electrolytes, replete as necessary


   -NPO





Dispo:


continue to monitor in ICU





Problem List





- Problems


(1) Abdominal pain


Code(s): R10.9 - UNSPECIFIED ABDOMINAL PAIN   Qualifiers: 


     Abdominal location: periumbilical     Qualified Code(s): R10.33 - 

Periumbilical pain  





(2) Acute diverticulitis


Code(s): K57.92 - DVTRCLI OF INTEST, PART UNSP, W/O PERF OR ABSCESS W/O BLEED





(3) Colitis


Code(s): K52.9 - NONINFECTIVE GASTROENTERITIS AND COLITIS, UNSPECIFIED





(4) Other specified hypothyroidism


Code(s): E03.8 - OTHER SPECIFIED HYPOTHYROIDISM








Visit type





- Emergency Visit


Emergency Visit: Yes


ED Registration Date: 07/10/17


Care time: The patient presented to the Emergency Department on the above date 

and was hospitalized for further evaluation of their emergent condition.





- New Patient


This patient is new to me today: No





- Critical Care


Critical Care patient: Yes


Total Critical Care Time (in minutes): 35


Critical Care Statement: The care of this patient involved high complexity 

decision making to prevent further life threatening deterioration of the patient

's condition and/or to evalute & treat vital organ system(s) failure or risk of 

failure.

## 2017-07-14 NOTE — PN
Teaching Attending Note


Name of Resident: Blas Wagner


ATTENDING PHYSICIAN STATEMENT





I saw and evaluated the patient.


I reviewed the resident's note and discussed the case with the resident.


I agree with the resident's findings and plan as documented.








SUBJECTIVE:


Patient seen and examined in the ICU. Abdominal pain improving, but still with 

some discomfort in the low anterior region.


(+) small BM/flatus. 


For repeat CT abdomen/pelvis. 


No CP or SOB. 





 Intake & Output











 07/11/17 07/12/17 07/13/17 07/14/17





 23:59 23:59 23:59 23:59


 


Intake Total 3250 3250 2500 1300


 


Output Total 200 225  


 


Balance 3050 3025 2500 1300


 


Weight 245 lb 2.464 oz 243 lb 11.2 oz 243 lb 1.6 oz 








 Last Vital Signs











Temp Pulse Resp BP Pulse Ox


 


 97.6 F   74   18   156/100   99 


 


 07/14/17 06:00  07/14/17 06:00  07/14/17 06:00  07/14/17 06:00  07/13/17 19:38








Active Medications





Acetaminophen (Ofirmev Injection -)  1,000 mg IVPB Q6H PRN


   PRN Reason: FEVER OR PAIN


   Stop: 07/14/17 22:22


   Last Admin: 07/14/17 03:40 Dose:  1,000 mg


Enoxaparin Sodium (Lovenox -)  40 mg SQ DAILY [facility]


   Last Admin: 07/14/17 10:10 Dose:  40 mg


Metronidazole (Flagyl 500mg Premixed Ivpb -)  100 mls @ 100 mls/hr IVPB Q8H-IV 

[facility]


   Last Admin: 07/14/17 10:10 Dose:  100 mls/hr


Levofloxacin (Levaquin 500 Mg Premixed Ivpb -)  100 mls @ 100 mls/hr IVPB DAILY 

[facility]


   Last Admin: 07/14/17 10:10 Dose:  100 mls/hr


Dextrose/Sodium Chloride (Dextrose 5%-Normal Saline+20 Meq Kcl -)  1,000 mls @ 

100 mls/hr IV ASDIR [facility]


   Last Admin: 07/14/17 01:52 Dose:  100 mls/hr


Levothyroxine Sodium (Synthroid Injection -)  50 mcg IVPUSH DAILY [facility]


   Last Admin: 07/14/17 10:14 Dose:  50 mcg


Metoprolol Tartrate (Lopressor Injection -)  5 mg IVPUSH Q4H PRN


   PRN Reason: HYPERTENSION


Morphine Sulfate (Morphine Injection -)  2 mg IVPUSH Q3H PRN


   PRN Reason: PAIN


   Last Admin: 07/12/17 11:04 Dose:  2 mg


Ondansetron HCl (Zofran Injection)  8 mg IVPUSH Q6H PRN


   PRN Reason: NAUSEA AND/OR VOMITING


   Last Admin: 07/13/17 05:50 Dose:  8 mg


Pantoprazole Sodium (Protonix 40mg Ivpb (Pre-Docked))  40 mg IVPB DAILY [facility]


   Last Admin: 07/14/17 10:11 Dose:  40 mg








Gen:  NAD at rest


Heart:  RRR


Lung: decreased breath sounds at the bases


Abd: soft, mild periumbilical tenderness, no rebound


Ext: no edema





 Laboratory Results - last 24 hr











  07/14/17 07/14/17 07/14/17





  05:05 05:05 05:05


 


WBC  5.9  D  


 


RBC  3.60  


 


Hgb  10.3 L  


 


Hct  31.3 L  


 


MCV  87.0  


 


MCH  28.6  


 


MCHC  32.8  


 


RDW  13.5  


 


Plt Count  336  


 


MPV  7.1 L  


 


ESR   


 


Sodium   142 


 


Potassium   3.9 


 


Chloride   109 H 


 


Carbon Dioxide   28 


 


Anion Gap   5 L 


 


BUN   5 L 


 


Creatinine   0.6 


 


Creat Clearance w eGFR   > 60 


 


Random Glucose   105 


 


Calcium   8.2 L 


 


Ferritin   


 


Total Bilirubin   0.3  D 


 


AST   15 


 


ALT   34  D 


 


Alkaline Phosphatase   146 H 


 


C-Reactive Protein    7.2 H D


 


Total Protein   5.2 L 


 


Albumin   2.5 L 














  07/14/17 07/14/17





  05:05 10:15


 


WBC  


 


RBC  


 


Hgb  


 


Hct  


 


MCV  


 


MCH  


 


MCHC  


 


RDW  


 


Plt Count  


 


MPV  


 


ESR  53 H 


 


Sodium  


 


Potassium  


 


Chloride  


 


Carbon Dioxide  


 


Anion Gap  


 


BUN  


 


Creatinine  


 


Creat Clearance w eGFR  


 


Random Glucose  


 


Calcium  


 


Ferritin   151.578


 


Total Bilirubin  


 


AST  


 


ALT  


 


Alkaline Phosphatase  


 


C-Reactive Protein  


 


Total Protein  


 


Albumin  











ASSESSMENT AND PLAN:


(?) Perforation 


Abdominal Pain


r/o Diverticulitis


HTN


Hypothyroidism


Mitral Valve Prolapse








-  For CT scan 


-  ABX 


-  f/u cultures


-  pain control


-  antiemetics


-  incentive spirometry


-  NPO


-  IVF


-  DVT prophylaxis








Dr Begum

## 2017-07-14 NOTE — PN
Progress Note, Physician


History of Present Illness: 


5th day on flagyl and levofloxacin still NPO.


Feels much better, no new complaints, no fevers or chills


Looking forward to eating


No vomiting, had a bowel movement this am


Pain level today 2/10, 





Preliminary blood culture negative


awaiting C diff toxin and stool gram stain and culture 


Scheduled for CT scan of abdomen per GI





- Current Medication List


Current Medications: 


Active Medications





Acetaminophen (Ofirmev Injection -)  1,000 mg IVPB Q6H PRN


   PRN Reason: FEVER OR PAIN


   Stop: 07/14/17 22:22


   Last Admin: 07/14/17 03:40 Dose:  1,000 mg


Enoxaparin Sodium (Lovenox -)  40 mg SQ DAILY [facility]


   Last Admin: 07/13/17 09:27 Dose:  40 mg


Metronidazole (Flagyl 500mg Premixed Ivpb -)  100 mls @ 100 mls/hr IVPB Q8H-IV 

[facility]


   Last Admin: 07/14/17 01:52 Dose:  100 mls/hr


Levofloxacin (Levaquin 500 Mg Premixed Ivpb -)  100 mls @ 100 mls/hr IVPB DAILY 

[facility]


   Last Admin: 07/13/17 09:33 Dose:  100 mls/hr


Dextrose/Sodium Chloride (Dextrose 5%-Normal Saline+20 Meq Kcl -)  1,000 mls @ 

100 mls/hr IV ASDIR [facility]


   Last Admin: 07/14/17 01:52 Dose:  100 mls/hr


Levothyroxine Sodium (Synthroid Injection -)  50 mcg IVPUSH DAILY [facility]


   Last Admin: 07/13/17 09:27 Dose:  50 mcg


Metoprolol Tartrate (Lopressor Injection -)  5 mg IVPUSH Q4H PRN


   PRN Reason: HYPERTENSION


Morphine Sulfate (Morphine Injection -)  2 mg IVPUSH Q3H PRN


   PRN Reason: PAIN


   Last Admin: 07/12/17 11:04 Dose:  2 mg


Ondansetron HCl (Zofran Injection)  8 mg IVPUSH Q6H PRN


   PRN Reason: NAUSEA AND/OR VOMITING


   Last Admin: 07/13/17 05:50 Dose:  8 mg


Pantoprazole Sodium (Protonix 40mg Ivpb (Pre-Docked))  40 mg IVPB DAILY [facility]


   Last Admin: 07/13/17 11:43 Dose:  40 mg











- Objective


Vital Signs: 


 Vital Signs











Temperature  97.6 F   07/14/17 06:00


 


Pulse Rate  74   07/14/17 06:00


 


Respiratory Rate  18   07/14/17 06:00


 


Blood Pressure  156/100   07/14/17 06:00


 


O2 Sat by Pulse Oximetry (%)  99   07/13/17 19:38








 





Constitutional: Yes: Calm, Obese


Eyes: Yes: Conjunctiva Clear.  No: Sclera Icterus


HENT: Yes: Atraumatic.  No: Drooling, Epistaxis, Hoarseness, Nasal Congestion, 

Pharyngeal Erythema, Rhinnorhea, Thrush, Tonsillar Exudate


Neck: Yes: Supple.  No: Rigid, Tenderness


Cardiovascular: Yes: S1, S2.  No: Murmur


Respiratory: Yes: CTA Bilaterally.  No: On BiPap, On Nasal O2, On Venti-Mask, 

Rales, Rhonchi, SOB, Tachypnea


Gastrointestinal: Yes: Normal Bowel Sounds, Soft, Abdomen, Obese, Tenderness (

mild tenderness suprapubic region, right hypochondrial)


...Rectal Exam: Yes: Deferred


Genitourinary: Yes: CVA Tenderness - Left.  No: CVA Tenderness - Right, Ascencio 

Present, Oliguria


Edema: No


Neurological: Yes: Alert, Oriented


Psychiatric: Yes: Alert, Oriented


Labs: 


 CBC, BMP





 07/14/17 05:05 





 07/14/17 05:05 











Problem List





- Problems


(1) Acute diverticulitis


Assessment/Plan: 


Acute diverticulitis with rupture and localized pnuemoperitoneum


 R/O Merkels diverticulum


Code(s): K57.92 - DVTRCLI OF INTEST, PART UNSP, W/O PERF OR ABSCESS W/O BLEED





(2) Small bowel obstruction


Assessment/Plan: 


Inflammed small bowel on imaging , some improvement, passed loose stool today


Code(s): K56.69 - OTHER INTESTINAL OBSTRUCTION








Impression/Plan


Impression/Plan: 


5th day on flagy and levoquin


still NPO for ruptured diverticulum


feels better, pain markedly reduced, moved bowel this am


For CT abdomen





Visit type





- Emergency Visit


Emergency Visit: No





- New Patient


This patient is new to me today: No





- Critical Care


Critical Care patient: No





- Discharge Referral


Referred to [facility] No

## 2017-07-14 NOTE — PN
Progress Note (short form)





- Note


Progress Note: 


No acute events


No pain


+BM


 Vital Signs











 Period  Temp  Pulse  Resp  BP Sys/Collazo  Pulse Ox


 


 Last 24 Hr  97.6 F-98.8 F  62-85  16-22  121-156/  99








Abd soft, NT, ND








 CBC, BMP





 07/14/17 05:05 





 07/14/17 05:05 








Clears


Advance diet as tolerated





Problem List





- Problems


(1) Abdominal pain


Code(s): R10.9 - UNSPECIFIED ABDOMINAL PAIN   Qualifiers: 


        Qualified Code(s): R10.33 - Periumbilical pain

## 2017-07-14 NOTE — PN
Progress Note, Physician


Chief Complaint: 


Abdominal pain


History of Present Illness: 


Mild discomfort this am in hypogastric/suprapubic region, mild nausea after 

receiving morphine for pain, felt better after receiving zofran IVPB, has 

family hx of diverticulosis on mothers side. No previous episode. No 

colonoscopy in the past. 


Had solid BM this AM-likely no cdiff present, +flatulence, seen by surgery, no 

sx recommended at this time. Stool culture, c diff pending.





- Current Medication List


Current Medications: 


Active Medications





Acetaminophen (Ofirmev Injection -)  1,000 mg IVPB Q6H PRN


   PRN Reason: FEVER OR PAIN


   Stop: 07/14/17 22:22


   Last Admin: 07/14/17 03:40 Dose:  1,000 mg


Enoxaparin Sodium (Lovenox -)  40 mg SQ DAILY [facility]


   Last Admin: 07/14/17 10:10 Dose:  40 mg


Metronidazole (Flagyl 500mg Premixed Ivpb -)  100 mls @ 100 mls/hr IVPB Q8H-IV 

[facility]


   Last Admin: 07/14/17 10:10 Dose:  100 mls/hr


Levofloxacin (Levaquin 500 Mg Premixed Ivpb -)  100 mls @ 100 mls/hr IVPB DAILY 

[facility]


   Last Admin: 07/14/17 10:10 Dose:  100 mls/hr


Dextrose/Sodium Chloride (Dextrose 5%-Normal Saline+20 Meq Kcl -)  1,000 mls @ 

100 mls/hr IV ASDIR [facility]


   Last Admin: 07/14/17 01:52 Dose:  100 mls/hr


Levothyroxine Sodium (Synthroid Injection -)  50 mcg IVPUSH DAILY [facility]


   Last Admin: 07/14/17 10:14 Dose:  50 mcg


Metoprolol Tartrate (Lopressor Injection -)  5 mg IVPUSH Q4H PRN


   PRN Reason: HYPERTENSION


Morphine Sulfate (Morphine Injection -)  2 mg IVPUSH Q3H PRN


   PRN Reason: PAIN


   Last Admin: 07/12/17 11:04 Dose:  2 mg


Ondansetron HCl (Zofran Injection)  8 mg IVPUSH Q6H PRN


   PRN Reason: NAUSEA AND/OR VOMITING


   Last Admin: 07/13/17 05:50 Dose:  8 mg


Pantoprazole Sodium (Protonix 40mg Ivpb (Pre-Docked))  40 mg IVPB DAILY [facility]


   Last Admin: 07/14/17 10:11 Dose:  40 mg











- Objective


Vital Signs: 


 Vital Signs











Temperature  97.6 F   07/14/17 06:00


 


Pulse Rate  74   07/14/17 06:00


 


Respiratory Rate  18   07/14/17 06:00


 


Blood Pressure  156/100   07/14/17 06:00


 


O2 Sat by Pulse Oximetry (%)  99   07/13/17 19:38











Constitutional: Yes: Well Nourished, No Distress, Calm


Cardiovascular: Yes: Regular Rate and Rhythm


Respiratory: Yes: Regular


Gastrointestinal: Yes: Hyperactive Bowel Sounds


Musculoskeletal: Yes: WNL


Extremities: Yes: WNL


Edema: No


Peripheral Pulses WNL: Yes


Neurological: Yes: Alert, Oriented


Psychiatric: Yes: Alert, Oriented


Labs: 


 CBC, BMP





 07/14/17 05:05 





 07/14/17 05:05 











Problem List





- Problems


(1) Abdominal pain


Assessment/Plan: 


-much better today


-pain management


-IVF


-IV abx


-had loose BM today


-no vomiting today


-going for CT enterography


Code(s): R10.9 - UNSPECIFIED ABDOMINAL PAIN   Qualifiers: 


     Abdominal location: periumbilical     Qualified Code(s): R10.33 - 

Periumbilical pain  





(2) Acute diverticulitis


Assessment/Plan: 


-seen by GI and surgery, no intervention recommended at this time.


Code(s): K57.92 - DVTRCLI OF INTEST, PART UNSP, W/O PERF OR ABSCESS W/O BLEED





(3) Other specified hypothyroidism


Assessment/Plan: 


on IV levothyroxine


Code(s): E03.8 - OTHER SPECIFIED HYPOTHYROIDISM





(4) Anemia


Assessment/Plan: 


-check iron profile


-stool OB


-could be secondary to colon inflammation


Code(s): D64.9 - ANEMIA, UNSPECIFIED   








Assessment/Plan


.


-IV abx


-IVF


-Pain management with IV acetaminophen and morphine as needed


-Antiemetic-zofran increased


-PPI


-DVT prophylaxis


-IV levothyroxine at 50 mcg


-going for CT enterography today


-seen by surgery and endocrinology


-check stools and labs for anemia

## 2017-07-14 NOTE — PN
Teaching Attending Note


Name of Resident: Leslie Sahni


ATTENDING PHYSICIAN STATEMENT





I saw and evaluated the patient.


I reviewed the resident's note and discussed the case with the resident.


I agree with the resident's findings and plan as documented.








SUBJECTIVE:


OOB in chair


Reports marked improvement in abdominal pain


Remains NPO     Repeat CT later today


+ soft BMs/ flatus


No fever/ chills








OBJECTIVE:


Cor S1S2


Lungs clear


Abdomen soft, mild periumbilical tenderness


no edema








ASSESSMENT AND PLAN:


Probable acute complicated diverticulitis


Continue empiric levaquin/ flagyl


For repeat CT scan today

## 2017-07-14 NOTE — PN
GI Progress Note


Subjective: 


GASTROENTEROLOGY





FEELS EVEN BETTER THAN YESTERDAY


CT SCAN SHOWS AIR AND FLUID COLLECTIONS IN ANTERIOR AND POSTERIOR RLQ ETIOLOGY 

IS UNCERTAIN


NO FEVER, WBC COUNT NOW NORMAL





- Objective


Vital Signs: 


 Vital Signs











Temperature  97.6 F   07/14/17 06:00


 


Pulse Rate  74   07/14/17 06:00


 


Respiratory Rate  18   07/14/17 09:00


 


Blood Pressure  156/100   07/14/17 06:00


 


O2 Sat by Pulse Oximetry (%)  99   07/14/17 09:00











Constitutional: No Distress


Eyes: Yes: Conjunctiva Clear


HENT: Yes: Normocephalic


Neck: Yes: Supple


Cardiovascular: Yes: Regular Rate and Rhythm


Respiratory: Yes: Regular


Gastrointestinal Inspection: Yes: WNL


...Auscultate: Yes: Hypoactive Bowel Sounds


...Palpate: Yes: Soft


Extremities: Yes: WNL


Labs: 


 CBC, BMP





 07/14/17 05:05 





 07/14/17 05:05 











- ....Imaging


Cat Scan: Image Reviewed





Problem List





- Problems


(1) Intra-abdominal abscess


Assessment/Plan: 


NEW FINDINGS ON CT SCAN BUT CLINICALLY IMPROVED. AWAIT OK FROM SURGERY ON CLEAR 

LIQUID DIET. CONTINUE IV ABX, CBC,CRP.  ETIOLOGY OF PERFORATION STILL IN 

QUESTION.  Dr ROSS SUGGESTS POSSIBLE MECKEL'S DIVERTICULUM PERFORATION.  

CONTINUE PROTONIX.  MECKEL SCAN??? WILL SPEAK WITH DR RAYO. 


Code(s): K65.1 - PERITONEAL ABSCESS





(2) Perforation bowel


Code(s): K63.1 - PERFORATION OF INTESTINE (NONTRAUMATIC)





(3) Diverticulitis of colon with perforation


Code(s): K57.20 - DVTRCLI OF LG INT W PERFORATION AND ABSCESS W/O BLEEDING   





(4) Enteritis


Code(s): K52.9 - NONINFECTIVE GASTROENTERITIS AND COLITIS, UNSPECIFIED





(5) Diverticulosis


Code(s): K57.90 - DVRTCLOS OF INTEST, PART UNSP, W/O PERF OR ABSCESS W/O BLEED 

  





(6) Abdominal pain


Code(s): R10.9 - UNSPECIFIED ABDOMINAL PAIN   Qualifiers: 


     Abdominal location: periumbilical     Qualified Code(s): R10.33 - 

Periumbilical pain  





(7) Other specified hypothyroidism


Code(s): E03.8 - OTHER SPECIFIED HYPOTHYROIDISM

## 2017-07-15 LAB
ALBUMIN SERPL-MCNC: 2.7 G/DL (ref 3.4–5)
ALP SERPL-CCNC: 141 U/L (ref 45–117)
ALT SERPL-CCNC: 32 U/L (ref 12–78)
ANION GAP SERPL CALC-SCNC: 8 MMOL/L (ref 8–16)
AST SERPL-CCNC: 19 U/L (ref 15–37)
BILIRUB SERPL-MCNC: 0.6 MG/DL (ref 0.2–1)
CALCIUM SERPL-MCNC: 8.7 MG/DL (ref 8.5–10.1)
CO2 SERPL-SCNC: 27 MMOL/L (ref 21–32)
CREAT SERPL-MCNC: 0.5 MG/DL (ref 0.55–1.02)
DEPRECATED RDW RBC AUTO: 13.5 % (ref 11.6–15.6)
GLUCOSE SERPL-MCNC: 97 MG/DL (ref 74–106)
IRON SERPL-MCNC: 23 UG/DL (ref 27–159)
MCH RBC QN AUTO: 29 PG (ref 25.7–33.7)
MCHC RBC AUTO-ENTMCNC: 33.9 G/DL (ref 32–36)
MCV RBC: 85.5 FL (ref 80–96)
PLATELET # BLD AUTO: 327 K/MM3 (ref 134–434)
PMV BLD: 6.9 FL (ref 7.5–11.1)
PROT SERPL-MCNC: 5.4 G/DL (ref 6.4–8.2)
TIBC SERPL-MCNC: 186 UG/DL (ref 250–450)
UIBC SERPL-MCNC: 163 UG/DL (ref 131–425)
WBC # BLD AUTO: 5.4 K/MM3 (ref 4–10)

## 2017-07-15 RX ADMIN — METRONIDAZOLE SCH MLS/HR: 500 INJECTION, SOLUTION INTRAVENOUS at 18:10

## 2017-07-15 RX ADMIN — LEVOTHYROXINE SODIUM ANHYDROUS SCH MCG: 500 INJECTION, POWDER, LYOPHILIZED, FOR SOLUTION INTRAVENOUS at 09:23

## 2017-07-15 RX ADMIN — ENOXAPARIN SODIUM SCH MG: 40 INJECTION SUBCUTANEOUS at 09:11

## 2017-07-15 RX ADMIN — METRONIDAZOLE SCH MLS/HR: 500 INJECTION, SOLUTION INTRAVENOUS at 09:11

## 2017-07-15 RX ADMIN — LEVOFLOXACIN SCH MLS/HR: 5 INJECTION, SOLUTION INTRAVENOUS at 09:11

## 2017-07-15 RX ADMIN — PANTOPRAZOLE SODIUM SCH MG: 40 INJECTION, POWDER, FOR SOLUTION INTRAVENOUS at 09:11

## 2017-07-15 RX ADMIN — METRONIDAZOLE SCH MLS/HR: 500 INJECTION, SOLUTION INTRAVENOUS at 02:41

## 2017-07-15 NOTE — PN
Progress Note (short form)





- Note


Progress Note: 


feels well, some gas pain last night


this am much less pain





 Vital Signs











 Period  Temp  Pulse  Resp  BP Sys/Collazo  Pulse Ox


 


 Last 24 Hr  98 F-98.6 F  58-87  17-22  138-150/  99-99








cor-rrr


lungs clear


abd soft,NT


mild discomfort below umbilicus


ext no edema





 CBC, BMP





 07/15/17 05:10 





 07/15/17 05:10 





 Microbiology





07/14/17 06:00   Stool   Gram Stain - Final


07/11/17 15:10   Blood - Peripheral Venous   Blood Culture - Preliminary


                            NO GROWTH OBTAINED AFTER 72 HOURS, INCUBATION TO 

CONTINUE


                            FOR 2 DAYS.


07/11/17 15:20   Blood - Peripheral Venous   Blood Culture - Preliminary


                            NO GROWTH OBTAINED AFTER 72 HOURS, INCUBATION TO 

CONTINUE


                            FOR 2 DAYS.


07/14/17 06:00   Stool   Clostridium difficile Antigen (SONALI) - Final


07/14/17 06:00   Stool   Clostridium difficile Toxin Assay - Final





ct scan with 2 RLQ abscesses





a/p


intra-abdominal abscesses- continue levaquin/flagyl- no fever/normal WBC


further management per GI and surgery

## 2017-07-15 NOTE — PN
Progress Note (short form)





- Note


Progress Note: 


Pulm/CCM





SUBJECTIVE:


Patient seen and examined in the ICU. 








24HR: 


CT with two LRQ abcesses 


afebrile, no complaints


trying clears


 Vital Signs











Temp  98.2 F   07/15/17 06:00


 


Pulse  77   07/15/17 06:00


 


Resp  18   07/15/17 07:57


 


BP  141/103   07/15/17 06:00


 


Pulse Ox  99   07/15/17 07:57








 Intake & Output











 07/14/17 07/15/17 07/15/17





 23:59 11:59 23:59


 


Intake Total 300 1000 


 


Balance 300 1000 


 


Intake:   


 


  IV  900 


 


    Dextrose 5%-Normal Saline  900 





    +20 Meq KCl - 1,000 ml @   





    100 mls/hr IV ASDIR [facility]   





    Rx#:[id number]   


 


  IVPB  100 


 


  Oral 300  


 


Other:   


 


  Voiding Method Toilet Toilet 


 


  # Unmeasured Voids   


 


    Void 2 3 














 CBC, BMP





 07/15/17 05:10 





 07/15/17 05:10 








Gen:  INAD


Heart:  RRR


Lung: decreased breath sounds at the bases


Abd: soft, mild periumbilical tenderness, no rebound


Ext: no edema


Neuro: grossly intact








ASSESSMENT AND PLAN:


(?) Perforation 


Abdominal Pain


r/o Diverticulitis


HTN


Hypothyroidism


Mitral Valve Prolapse








-  Abcess management as per GI/Surgery


-  ABX 


-  pain control


-  antiemetics


-  incentive spirometry


-  clears, advance as tolerated


-  DVT prophylaxis


-  Ok for med surg floor





David Barakat ACNP


[id number]

## 2017-07-16 LAB
ANION GAP SERPL CALC-SCNC: 7 MMOL/L (ref 8–16)
CALCIUM SERPL-MCNC: 8.5 MG/DL (ref 8.5–10.1)
CO2 SERPL-SCNC: 30 MMOL/L (ref 21–32)
CREAT SERPL-MCNC: 0.6 MG/DL (ref 0.55–1.02)
DEPRECATED RDW RBC AUTO: 13.5 % (ref 11.6–15.6)
GLUCOSE SERPL-MCNC: 93 MG/DL (ref 74–106)
MCH RBC QN AUTO: 28.7 PG (ref 25.7–33.7)
MCHC RBC AUTO-ENTMCNC: 33.4 G/DL (ref 32–36)
MCV RBC: 85.9 FL (ref 80–96)
PLATELET # BLD AUTO: 367 K/MM3 (ref 134–434)
PMV BLD: 7 FL (ref 7.5–11.1)
WBC # BLD AUTO: 5.1 K/MM3 (ref 4–10)

## 2017-07-16 RX ADMIN — PANTOPRAZOLE SODIUM SCH MG: 40 TABLET, DELAYED RELEASE ORAL at 21:38

## 2017-07-16 RX ADMIN — LEVOTHYROXINE SODIUM ANHYDROUS SCH MCG: 500 INJECTION, POWDER, LYOPHILIZED, FOR SOLUTION INTRAVENOUS at 09:01

## 2017-07-16 RX ADMIN — METRONIDAZOLE SCH MLS/HR: 500 INJECTION, SOLUTION INTRAVENOUS at 17:05

## 2017-07-16 RX ADMIN — PANTOPRAZOLE SODIUM SCH MG: 40 INJECTION, POWDER, FOR SOLUTION INTRAVENOUS at 09:02

## 2017-07-16 RX ADMIN — METRONIDAZOLE SCH MLS/HR: 500 INJECTION, SOLUTION INTRAVENOUS at 09:02

## 2017-07-16 RX ADMIN — ENOXAPARIN SODIUM SCH MG: 40 INJECTION SUBCUTANEOUS at 09:02

## 2017-07-16 RX ADMIN — METRONIDAZOLE SCH MLS/HR: 500 INJECTION, SOLUTION INTRAVENOUS at 01:10

## 2017-07-16 RX ADMIN — LEVOFLOXACIN SCH MLS/HR: 5 INJECTION, SOLUTION INTRAVENOUS at 09:01

## 2017-07-16 NOTE — PN
Progress Note (short form)





- Note


Progress Note: 


Pulm/CCM





SUBJECTIVE:


Patient seen and examined in the ICU. 








24HR: 


No new events, diet progressing


in for floor bed


awaiting scan in am


 





 Vital Signs











Temp  98.4 F   07/16/17 07:00


 


Pulse  88   07/16/17 12:26


 


Resp  20   07/16/17 12:26


 


BP  154/88   07/16/17 12:26


 


Pulse Ox  99   07/15/17 21:00








 Intake & Output











 07/15/17 07/16/17 07/16/17





 23:59 11:59 23:59


 


Intake Total 400  


 


Balance 400  


 


Weight  109.951 kg 


 


Intake:   


 


    


 


    Dextrose 5%-Normal Saline 400  





    +20 Meq KCl - 1,000 ml @   





    100 mls/hr IV ASDIR [facility]ARR[facility]   





    Rx#:[id number]   


 


Other:   


 


  Voiding Method Toilet Toilet 


 


  # Unmeasured Voids   


 


    Void 2  


 


  Bowel Movement Yes: diarrhea  


 


  [facility] Measurement [facility]  [facility] 














 


 CBC, BMP





 07/16/17 05:20 





 07/16/17 05:20 











Gen:  INAD


Heart:  RRR


Lung: decreased breath sounds at the bases


Abd: soft, decreased pain


Ext: no edema


Neuro: grossly intact








ASSESSMENT AND PLAN:


(?) Perforation , now with ? abcess x 2


Abdominal Pain


r/o Diverticulitis


HTN


Hypothyroidism


Mitral Valve Prolapse








-  Abcess management as per GI/Surgery, for Merkel scan in AM


-  ABX as per ID


-  pain control


-  antiemetics


-  incentive spirometry


-  clears, advance as tolerated


-  DVT prophylaxis


- OOB at tolerated


-  Ok for med surg floor





David Barakat ACNP


[id number]

## 2017-07-16 NOTE — PN
Progress Note, Physician


Chief Complaint: 


Abdominal pain


History of Present Illness: 


Mild discomfort this am in hypogastric/suprapubic region


-CT abd shows two abscess


-Meckel scan as suggested per GI and Radiology


-labs improved


-tolerating clear liquids, advance diet to full liquids.


-acetaminophen IV for pain





- Current Medication List


Current Medications: 


Active Medications





Enoxaparin Sodium (Lovenox -)  40 mg SQ DAILY [facility]


   Last Admin: 07/16/17 09:02 Dose:  40 mg


Metronidazole (Flagyl 500mg Premixed Ivpb -)  100 mls @ 100 mls/hr IVPB Q8H-IV 

[facility]


   Last Admin: 07/16/17 09:02 Dose:  100 mls/hr


Levofloxacin (Levaquin 500 Mg Premixed Ivpb -)  100 mls @ 100 mls/hr IVPB DAILY 

[facility]


   Last Admin: 07/16/17 09:01 Dose:  100 mls/hr


Levothyroxine Sodium (Synthroid Injection -)  50 mcg IVPUSH DAILY [facility]


   Last Admin: 07/16/17 09:01 Dose:  50 mcg


Metoprolol Tartrate (Lopressor Injection -)  5 mg IVPUSH Q4H PRN


   PRN Reason: HYPERTENSION


Morphine Sulfate (Morphine Injection -)  2 mg IVPUSH Q3H PRN


   PRN Reason: PAIN


   Last Admin: 07/12/17 11:04 Dose:  2 mg


Ondansetron HCl (Zofran Injection)  8 mg IVPUSH Q6H PRN


   PRN Reason: NAUSEA AND/OR VOMITING


   Last Admin: 07/13/17 05:50 Dose:  8 mg


Pantoprazole Sodium (Protonix 40mg Ivpb (Pre-Docked))  40 mg IVPB DAILY [facility]


   Last Admin: 07/16/17 09:02 Dose:  40 mg











- Objective


Vital Signs: 


 Vital Signs











Temperature  98.4 F   07/16/17 07:00


 


Pulse Rate  666 H  07/16/17 07:00


 


Respiratory Rate  20   07/16/17 07:00


 


Blood Pressure  131/88   07/16/17 07:00


 


O2 Sat by Pulse Oximetry (%)  99   07/15/17 21:00











Constitutional: Yes: Well Nourished, No Distress, Calm


Cardiovascular: Yes: Regular Rate and Rhythm


Respiratory: Yes: Regular


Gastrointestinal: Yes: Normal Bowel Sounds


Musculoskeletal: Yes: WNL


Extremities: Yes: WNL


Edema: No


Peripheral Pulses WNL: Yes


Neurological: Yes: Alert, Oriented


Psychiatric: Yes: Alert, Oriented


Labs: 


 CBC, BMP





 07/16/17 05:20 





 07/16/17 05:20 











Problem List





- Problems


(1) Abdominal pain


Assessment/Plan: 


-much better today


-pain management


-IVF


-IV abx


-no vomiting today.


Code(s): R10.9 - UNSPECIFIED ABDOMINAL PAIN   Qualifiers: 


     Abdominal location: periumbilical     Qualified Code(s): R10.33 - 

Periumbilical pain  





(2) Acute diverticulitis


Assessment/Plan: 


-seen by GI and surgery, no intervention recommended at this time.


-CT abd showed two abscess


-recommended Meckel scan


Code(s): K57.92 - DVTRCLI OF INTEST, PART UNSP, W/O PERF OR ABSCESS W/O BLEED





(3) Other specified hypothyroidism


Assessment/Plan: 


on IV levothyroxine


Code(s): E03.8 - OTHER SPECIFIED HYPOTHYROIDISM





(4) Anemia


Assessment/Plan: 


-check iron profile


-stool OB


-could be secondary to colon inflammation


Code(s): D64.9 - ANEMIA, UNSPECIFIED   








Assessment/Plan


.


-IV abx


-IVF


-Pain management with IV acetaminophen and morphine as needed


-Antiemetic-zofran increased


-PPI


-DVT prophylaxis


-IV levothyroxine at 50 mcg


-CT enterography two abscess


-recommended Meckel scan as outpatient


-seen by surgery and endocrinology


-Iron deficiency, venofer 200 mg once


-check stools OB


-Meckel's scan in AM

## 2017-07-16 NOTE — PN
GI Progress Note


Subjective: 


GASTROENTEROLOGY





CONTINUED IMPROVEMENT


CT SCAN REVIEWED WITH DR ROSS TODAY





- Objective


Vital Signs: 


 Vital Signs











Temperature  98.2 F   07/16/17 14:00


 


Pulse Rate  88   07/16/17 16:00


 


Respiratory Rate  23   07/16/17 16:00


 


Blood Pressure  145/94   07/16/17 16:00


 


O2 Sat by Pulse Oximetry (%)  99   07/15/17 21:00











Constitutional: No Distress, Calm


Eyes: Yes: Conjunctiva Clear


HENT: Yes: Normocephalic


Cardiovascular: Yes: Regular Rate and Rhythm


Respiratory: Yes: Regular


Gastrointestinal Inspection: Yes: WNL


...Auscultate: Yes: Normoactive Bowel Sounds


...Palpate: Yes: Soft


Extremities: Yes: WNL


Labs: 


 CBC, BMP





 07/16/17 05:20 





 07/16/17 05:20 











Problem List





- Problems


(1) Intra-abdominal abscess


Assessment/Plan: 


CONTINUED IMPROVEMENT SEEN


DIET AS PER SURGERY





QUESTION STILL EXISTS AS TO ETIOLOGY:  DIVERTICULITIS OF SB DIVERTICULOSIS, 

DIVERTICILITIS OR PERFORATION OF MECKEL'S DIVERTICULUM, SIGMOID DIVERTICULITIS 

WITH PERFORATION. 





WOULD SUGGEST CONTINUE THERAPY AS OUTLINED,  MECKEL'S SCAN IN 4 TO 6 WEEKS NOT 

TOMORROW AS THERE IS TOO MUCH INFLAMMATION IN THIS AREA FOR TEST TO BE 

ACCURATE. SURGICAL FOLLOW AS OUTPATIENT TO DECIDE THE NEXT STEP AFTER SCAN IS 

COMPLETE.








KACI SUERO MD


Code(s): K65.1 - PERITONEAL ABSCESS





(2) Perforation bowel


Code(s): K63.1 - PERFORATION OF INTESTINE (NONTRAUMATIC)





(3) Diverticulitis of colon with perforation


Code(s): K57.20 - DVTRCLI OF LG INT W PERFORATION AND ABSCESS W/O BLEEDING   





(4) Enteritis


Code(s): K52.9 - NONINFECTIVE GASTROENTERITIS AND COLITIS, UNSPECIFIED





(5) Diverticulosis


Code(s): K57.90 - DVRTCLOS OF INTEST, PART UNSP, W/O PERF OR ABSCESS W/O BLEED 

  





(6) Abdominal pain


Code(s): R10.9 - UNSPECIFIED ABDOMINAL PAIN   Qualifiers: 


     Abdominal location: periumbilical     Qualified Code(s): R10.33 - 

Periumbilical pain  





(7) Other specified hypothyroidism


Code(s): E03.8 - OTHER SPECIFIED HYPOTHYROIDISM

## 2017-07-17 VITALS — SYSTOLIC BLOOD PRESSURE: 129 MMHG | HEART RATE: 78 BPM | DIASTOLIC BLOOD PRESSURE: 88 MMHG

## 2017-07-17 VITALS — TEMPERATURE: 97.6 F

## 2017-07-17 LAB
ALBUMIN SERPL-MCNC: 2.9 G/DL (ref 3.4–5)
ALP SERPL-CCNC: 126 U/L (ref 45–117)
ALT SERPL-CCNC: 31 U/L (ref 12–78)
ANION GAP SERPL CALC-SCNC: 6 MMOL/L (ref 8–16)
AST SERPL-CCNC: 21 U/L (ref 15–37)
BASOPHILS # BLD: 0.5 % (ref 0–2)
BILIRUB SERPL-MCNC: 0.3 MG/DL (ref 0.2–1)
CALCIUM SERPL-MCNC: 8.6 MG/DL (ref 8.5–10.1)
CO2 SERPL-SCNC: 30 MMOL/L (ref 21–32)
CREAT SERPL-MCNC: 0.6 MG/DL (ref 0.55–1.02)
DEPRECATED RDW RBC AUTO: 13.7 % (ref 11.6–15.6)
EOSINOPHIL # BLD: 2.7 % (ref 0–4.5)
GLUCOSE SERPL-MCNC: 89 MG/DL (ref 74–106)
MCH RBC QN AUTO: 28.5 PG (ref 25.7–33.7)
MCHC RBC AUTO-ENTMCNC: 33.5 G/DL (ref 32–36)
MCV RBC: 85.2 FL (ref 80–96)
NEUTROPHILS # BLD: 63.5 % (ref 42.8–82.8)
PLATELET # BLD AUTO: 344 K/MM3 (ref 134–434)
PMV BLD: 6.9 FL (ref 7.5–11.1)
PROT SERPL-MCNC: 5.7 G/DL (ref 6.4–8.2)
WBC # BLD AUTO: 5.7 K/MM3 (ref 4–10)

## 2017-07-17 RX ADMIN — METRONIDAZOLE SCH MLS/HR: 500 INJECTION, SOLUTION INTRAVENOUS at 09:16

## 2017-07-17 RX ADMIN — ENOXAPARIN SODIUM SCH MG: 40 INJECTION SUBCUTANEOUS at 09:14

## 2017-07-17 RX ADMIN — LEVOFLOXACIN SCH MLS/HR: 5 INJECTION, SOLUTION INTRAVENOUS at 09:16

## 2017-07-17 RX ADMIN — METRONIDAZOLE SCH MLS/HR: 500 INJECTION, SOLUTION INTRAVENOUS at 02:00

## 2017-07-17 RX ADMIN — PANTOPRAZOLE SODIUM SCH MG: 40 TABLET, DELAYED RELEASE ORAL at 09:15

## 2017-07-17 NOTE — PN
Progress Note, Physician


History of Present Illness: 


feels better


no cp or sob





- Current Medication List


Current Medications: 


Active Medications





Atenolol (Tenormin -)  25 mg PO DAILY [facility]


Enoxaparin Sodium (Lovenox -)  40 mg SQ DAILY [facility]


   Last Admin: 07/16/17 09:02 Dose:  40 mg


Metronidazole (Flagyl 500mg Premixed Ivpb -)  100 mls @ 100 mls/hr IVPB Q8H-IV 

[facility]


   Last Admin: 07/17/17 02:00 Dose:  100 mls/hr


Levofloxacin (Levaquin 500 Mg Premixed Ivpb -)  100 mls @ 100 mls/hr IVPB DAILY 

[facility]


   Last Admin: 07/16/17 09:01 Dose:  100 mls/hr


Levothyroxine Sodium (Synthroid -)  112 mcg PO DAILY@0700 [facility]


   Last Admin: 07/17/17 07:03 Dose:  112 mcg


Ondansetron HCl (Zofran Injection)  8 mg IVPUSH Q6H PRN


   PRN Reason: NAUSEA AND/OR VOMITING


   Last Admin: 07/13/17 05:50 Dose:  8 mg


Pantoprazole Sodium (Protonix -)  40 mg PO BID [facility]


   Last Admin: 07/16/17 21:38 Dose:  40 mg











- Objective


Vital Signs: 


 Vital Signs











Temperature  98.2 F   07/17/17 06:00


 


Pulse Rate  64   07/17/17 06:00


 


Respiratory Rate  18   07/17/17 06:00


 


Blood Pressure  158/88   07/17/17 06:00


 


O2 Sat by Pulse Oximetry (%)  99   07/16/17 21:00











Cardiovascular: Yes: Regular Rate and Rhythm


Respiratory: Yes: Regular, CTA Bilaterally


Gastrointestinal: Yes: Normal Bowel Sounds, Soft.  No: Tenderness


Labs: 


 CBC, BMP





 07/17/17 05:15 





 07/17/17 05:15 











Assessment/Plan


- Problems


(1) Abdominal pain


Assessment/Plan: 


-much better today


-pain free


-IVF


-IV abx


-no vomiting today.


Code(s): R10.9 - UNSPECIFIED ABDOMINAL PAIN   Qualifiers: 


     Abdominal location: periumbilical     Qualified Code(s): R10.33 - 

Periumbilical pain  





(2) Acute diverticulitis


Assessment/Plan: 


-seen by GI and surgery, no intervention recommended at this time.


-CT abd showed two abscess


-recommended Meckel scan in 4- 6 weeks


-id follow up


Code(s): K57.92 - DVTRCLI OF INTEST, PART UNSP, W/O PERF OR ABSCESS W/O BLEED





(3) Other specified hypothyroidism


Assessment/Plan: 


on levothyroxine


Code(s): E03.8 - OTHER SPECIFIED HYPOTHYROIDISM





(4) Anemia


Assessment/Plan: 


-Iron deficiency, venofer 200 mg once


-check stools OB


-could be secondary to colon inflammation


-gi w/u per gi


Code(s): D64.9 - ANEMIA, UNSPECIFIED   








Assessment/Plan


.


-IV abx


-IVF


-PPI


-DVT prophylaxis


-CT enterography two abscess


-recommended Meckel scan as outpatient


-seen by surgery and endocrinology

## 2017-07-17 NOTE — PN
Progress Note (short form)





- Note


Progress Note: 


No acute events


No pain


Tolerating diet


 Vital Signs











 Period  Temp  Pulse  Resp  BP Sys/Collazo  Pulse Ox


 


 Last 24 Hr  97.6 F-98.6 F  60-89  16-20  124-158/74-94  99-99








Abd soft, NT





 CBC, BMP





 07/17/17 05:15 





 07/17/17 05:15 





Diet


Meckel's scan as an outpatient


F/U in office after scan


[phone]





Problem List





- Problems


(1) Abdominal pain


Code(s): R10.9 - UNSPECIFIED ABDOMINAL PAIN   Qualifiers: 


     Abdominal location: periumbilical     Qualified Code(s): R10.33 - 

Periumbilical pain

## 2017-07-17 NOTE — PN
Teaching Attending Note


Name of Resident: Neo Ray


ATTENDING PHYSICIAN STATEMENT





I saw and evaluated the patient.


I reviewed the resident's note and discussed the case with the resident.


I agree with the resident's findings and plan as documented.








SUBJECTIVE:


Patient seen and examined in the ICU.  Abdominal pain improved.  


Tolerating PO intake. 





 Intake & Output











 07/14/17 07/15/17 07/16/17 07/17/17





 23:59 23:59 23:59 23:59


 


Intake Total 1600 1400 1000 150


 


Output Total   125 


 


Balance 1600 1400 875 150


 


Weight   242 lb 6.4 oz 242 lb 6.4 oz








 Last Vital Signs











Temp Pulse Resp BP Pulse Ox


 


 97.6 F   71   18   126/94   99 


 


 07/17/17 10:00  07/17/17 10:00  07/17/17 10:00  07/17/17 10:00  07/17/17 09:00








Active Medications





Atenolol (Tenormin -)  25 mg PO DAILY [facility]


   Last Admin: 07/17/17 09:15 Dose:  25 mg


Enoxaparin Sodium (Lovenox -)  40 mg SQ DAILY [facility]


   Last Admin: 07/17/17 09:14 Dose:  40 mg


Metronidazole (Flagyl 500mg Premixed Ivpb -)  100 mls @ 100 mls/hr IVPB Q8H-IV 

[facility]


   Last Admin: 07/17/17 09:16 Dose:  100 mls/hr


Levofloxacin (Levaquin 500 Mg Premixed Ivpb -)  100 mls @ 100 mls/hr IVPB DAILY 

[facility]


   Last Admin: 07/17/17 09:16 Dose:  100 mls/hr


Levothyroxine Sodium (Synthroid -)  112 mcg PO DAILY@0700 [facility]


   Last Admin: 07/17/17 07:03 Dose:  112 mcg


Ondansetron HCl (Zofran Injection)  8 mg IVPUSH Q6H PRN


   PRN Reason: NAUSEA AND/OR VOMITING


   Last Admin: 07/13/17 05:50 Dose:  8 mg


Pantoprazole Sodium (Protonix -)  40 mg PO BID [facility]


   Last Admin: 07/17/17 09:15 Dose:  40 mg





Gen:  NAD at rest


Heart:  RRR


Lung: decreased breath sounds at the bases


Abd: soft, non-tenderness, no rebound


Ext: no edema





 


 Laboratory Results - last 24 hr











  07/17/17 07/17/17





  05:15 05:15


 


WBC  5.7 


 


RBC  3.98 


 


Hgb  11.3 


 


Hct  33.9 


 


MCV  85.2 


 


MCH  28.5 


 


MCHC  33.5 


 


RDW  13.7 


 


Plt Count  344 


 


MPV  6.9 L 


 


Neutrophils %  63.5  D 


 


Lymphocytes %  26.0  D 


 


Monocytes %  7.3  D 


 


Eosinophils %  2.7 


 


Basophils %  0.5 


 


Sodium   142


 


Potassium   4.1


 


Chloride   106


 


Carbon Dioxide   30


 


Anion Gap   6 L


 


BUN   6 L D


 


Creatinine   0.6


 


Creat Clearance w eGFR   > 60


 


Random Glucose   89


 


Calcium   8.6


 


Total Bilirubin   0.3  D


 


AST   21


 


ALT   31


 


Alkaline Phosphatase   126 H


 


Total Protein   5.7 L


 


Albumin   2.9 L











ASSESSMENT AND PLAN:


(?) Perforation 


Abdominal Pain


r/o Diverticulitis


HTN


Hypothyroidism


Mitral Valve Prolapse





-  PO as tolerated 


-  incentive spirometry


-  DVT prophylaxis


-  D/C planning 





Dr Begum

## 2017-07-17 NOTE — PN
Physical Exam: 


SUBJECTIVE: 





Patient seen and examined at bedside in ICU. Belching, passing gas, bowel 

movement is normal, tolerating diet.








OBJECTIVE:





 Vital Signs











 Period  Temp  Pulse  Resp  BP Sys/Collazo  Pulse Ox


 


 Last 24 Hr  97.6 F-98.6 F  60-89  16-23  124-158/74-94  99-99











GENERAL: The patient is awake, alert, and fully oriented, in no acute distress.


HEAD: Normal with no signs of trauma.


EYES: extraocular movements intact, sclera anicteric, conjunctiva clear. No 

ptosis. 


ENT: Ears normal, nares patent


NECK: Trachea midline 


LUNGS: Breath sounds equal, clear to auscultation bilaterally, no wheezes


HEART: Regular rate and rhythm, S1, S2 without murmur, rub or gallop.


ABDOMEN: +mild periumbilical tenderness, Soft, nondistended, normoactive bowel 

sounds


EXTREMITIES: warm, well-perfused 


NEUROLOGICAL: Cranial nerves II through XII grossly intact. Normal speech, 

normal gait.


PSYCH: Normal mood, normal affect.


SKIN: Warm, dry














 Laboratory Results - last 24 hr











  07/17/17 07/17/17





  05:15 05:15


 


WBC  5.7 


 


RBC  3.98 


 


Hgb  11.3 


 


Hct  33.9 


 


MCV  85.2 


 


MCH  28.5 


 


MCHC  33.5 


 


RDW  13.7 


 


Plt Count  344 


 


MPV  6.9 L 


 


Neutrophils %  63.5  D 


 


Lymphocytes %  26.0  D 


 


Monocytes %  7.3  D 


 


Eosinophils %  2.7 


 


Basophils %  0.5 


 


Sodium   142


 


Potassium   4.1


 


Chloride   106


 


Carbon Dioxide   30


 


Anion Gap   6 L


 


BUN   6 L D


 


Creatinine   0.6


 


Creat Clearance w eGFR   > 60


 


Random Glucose   89


 


Calcium   8.6


 


Total Bilirubin   0.3  D


 


AST   21


 


ALT   31


 


Alkaline Phosphatase   126 H


 


Total Protein   5.7 L


 


Albumin   2.9 L








ASSESSMENT/PLAN:


52 y/o F with PMH of HTN, hypothyroidism presented to ER with 10/10 mid 

abdominal pain with sudden onset. Admitted to ICU for diverticulitis w/

perforation.





Neuro:


Mental status intact





Abd:


Abdominal pain likely secondary to diverticulitis vs. meckel diverticulum


   - resolved


   - Meckel diverticulum scan as outpatient





Endocrine


Hypothyroidism


   -synthroid 50 mcg daily.





Cardio


HTN


   -c/w lopressor 5 mg IV q4h prn for htn





Dispo


   - discharge





Visit type





- Emergency Visit


Emergency Visit: No





- New Patient


This patient is new to me today: Yes


Date on this admission: 07/17/17





- Critical Care


Critical Care patient: Yes


Total Critical Care Time (in minutes): 34


Critical Care Statement: The care of this patient involved high complexity 

decision making to prevent further life threatening deterioration of the patient

's condition and/or to evalute & treat vital organ system(s) failure or risk of 

failure.

## 2017-07-17 NOTE — PN
Progress Note, Physician


Chief Complaint: 


ID








Levofloxacin and metronidazole








Feels well No abd pain





- Current Medication List


Current Medications: 


Active Medications





Atenolol (Tenormin -)  25 mg PO DAILY [facility]


   Last Admin: 07/17/17 09:15 Dose:  25 mg


Enoxaparin Sodium (Lovenox -)  40 mg SQ DAILY [facility]


   Last Admin: 07/17/17 09:14 Dose:  40 mg


Metronidazole (Flagyl 500mg Premixed Ivpb -)  100 mls @ 100 mls/hr IVPB Q8H-IV 

[facility]


   Last Admin: 07/17/17 09:16 Dose:  100 mls/hr


Levofloxacin (Levaquin 500 Mg Premixed Ivpb -)  100 mls @ 100 mls/hr IVPB DAILY 

[facility]


   Last Admin: 07/17/17 09:16 Dose:  100 mls/hr


Levothyroxine Sodium (Synthroid -)  112 mcg PO DAILY@0700 [facility]


   Last Admin: 07/17/17 07:03 Dose:  112 mcg


Ondansetron HCl (Zofran Injection)  8 mg IVPUSH Q6H PRN


   PRN Reason: NAUSEA AND/OR VOMITING


   Last Admin: 07/13/17 05:50 Dose:  8 mg


Pantoprazole Sodium (Protonix -)  40 mg PO BID [facility]


   Last Admin: 07/17/17 09:15 Dose:  40 mg











- Objective


Vital Signs: 


 Vital Signs











Temperature  97.6 F   07/17/17 10:00


 


Pulse Rate  71   07/17/17 10:00


 


Respiratory Rate  18   07/17/17 10:00


 


Blood Pressure  126/94   07/17/17 10:00


 


O2 Sat by Pulse Oximetry (%)  99   07/17/17 09:00











Constitutional: Yes: Well Nourished, No Distress


HENT: Yes: WNL, Atraumatic


Neck: Yes: WNL, Supple


Cardiovascular: Yes: Regular Rate and Rhythm, S1, S2.  No: Murmur


Respiratory: Yes: WNL, Regular, CTA Bilaterally


Gastrointestinal: Yes: WNL, Normal Bowel Sounds, Soft, Tenderness


Edema: No


Labs: 


 CBC, BMP





 07/17/17 05:15 





 07/17/17 05:15 











Assessment/Plan


Microbiology





07/11/17 15:20   Blood - Peripheral Venous   Blood Culture - Final


                              NO GROWTH AFTER 5 DAYS INCUBATION


07/11/17 15:10   Blood - Peripheral Venous   Blood Culture - Final


                              NO GROWTH AFTER 5 DAYS INCUBATION





 Laboratory Tests











  07/14/17 07/15/17 07/17/17





  05:05 05:10 05:15


 


WBC    5.7


 


Hgb    11.3


 


Hct    33.9


 


Plt Count    344


 


ESR  53 H  


 


C-Reactive Protein   3.9 H D 








Assessment  Diverticulitis improving








Plan


Antibiotics can be continued as oral equivalents





Evaristo FRIAS

## 2021-01-02 NOTE — PN
Follow-up lactation consult with Diana and observed excellent latch with Genie at breast. Diana reported excitedly that she has breast fed the girls tandem this morning. Praise given Discussed tandem feedings as option when works for both girls but also stressed importance of having opposing hand available to compress breast to assist with milk transfer. Denies further questions or needs. See Infant education record.  Provided Lactation Support Services contact information. Encouraged to call with questions, concerns or for assistance with feedings as needed or desired.       Progress Note, Physician


Chief Complaint: 


Abdominal pain


History of Present Illness: 


Mild discomfort this am in hypogastric/suprapubic region


-CT abd shows two abscess


-Meckel scan as suggested per GI and Radiology


-labs improved


-tolerating clear liquids, advance diet to full liquids.


-acetaminophen IV for pain





- Current Medication List


Current Medications: 


Active Medications





Acetaminophen (Ofirmev Injection -)  1,000 mg IVPB Q6H PRN


   PRN Reason: FEVER OR PAIN


   Stop: 07/16/17 04:32


Enoxaparin Sodium (Lovenox -)  [facility] SQ [facility]ILY [facility]


   Last Admin: 07/15/17 09:11 Dose:  40 mg


Metronidazole (Flagyl 500mg Premixed Ivpb -)  100 mls @ 100 mls/hr IVPB Q8H-IV 

[facility]


   Last Admin: 07/15/17 09:11 Dose:  100 mls/hr


Levofloxacin (Levaquin 500 Mg Premixed Ivpb -)  100 mls @ 100 mls/hr IVPB DAILY 

[facility]


   Last Admin: 07/15/17 09:11 Dose:  100 mls/hr


Levothyroxine Sodium (Synthroid Injection -)  50 mcg IVPUSH [facility]ILY [facility]


   Last Admin: 07/15/17 09:23 Dose:  50 mcg


Metoprolol Tartrate (Lopressor Injection -)  5 mg IVPUSH Q4H PRN


   PRN Reason: HYPERTENSION


Morphine Sulfate (Morphine Injection -)  2 mg IVPUSH Q3H PRN


   PRN Reason: PAIN


   Last Admin: 07/12/17 11:04 Dose:  2 mg


Ondansetron HCl (Zofran Injection)  8 mg IVPUSH Q6H PRN


   PRN Reason: NAUSEA AND/OR VOMITING


   Last Admin: 07/13/17 05:50 Dose:  8 mg


Pantoprazole Sodium (Protonix 40mg Ivpb (Pre-Docked))  40 mg IVPB DAILY [facility]


   Last Admin: 07/15/17 09:11 Dose:  40 mg











- Objective


Vital Signs: 


 Vital Signs











Temperature  98.2 F   07/15/17 06:00


 


Pulse Rate  77   07/15/17 06:00


 


Respiratory Rate  18   07/15/17 07:57


 


Blood Pressure  141/103   07/15/17 06:00


 


O2 Sat by Pulse Oximetry (%)  99   07/15/17 07:57











Constitutional: Yes: Well Nourished, No Distress, Calm


Cardiovascular: Yes: Regular Rate and Rhythm


Respiratory: Yes: Regular


Gastrointestinal: Yes: Normal Bowel Sounds


Musculoskeletal: Yes: WNL


Extremities: Yes: WNL


Edema: No


Peripheral Pulses WNL: Yes


Neurological: Yes: Alert, Oriented


Psychiatric: Yes: Alert, Oriented


Labs: 


 CBC, BMP





 07/15/17 05:10 





 07/15/17 05:10 











Problem List





- Problems


(1) Abdominal pain


Assessment/Plan: 


-much better today


-pain management


-IVF


-IV abx


-no vomiting today.


Code(s): R10.9 - UNSPECIFIED ABDOMINAL PAIN   Qualifiers: 


     Abdominal location: periumbilical     Qualified Code(s): R10.33 - 

Periumbilical pain  





(2) Acute diverticulitis


Assessment/Plan: 


-seen by GI and surgery, no intervention recommended at this time.


-CT abd showed two abscess


-recommended Meckel scan


Code(s): K57.92 - DVTRCLI OF INTEST, PART UNSP, W/O PERF OR ABSCESS W/O BLEED





(3) Other specified hypothyroidism


Assessment/Plan: 


on IV levothyroxine


Code(s): E03.8 - OTHER SPECIFIED HYPOTHYROIDISM





(4) Anemia


Assessment/Plan: 


-check iron profile


-stool OB


-could be secondary to colon inflammation


Code(s): D64.9 - ANEMIA, UNSPECIFIED   








Assessment/Plan


.


-IV abx


-IVF


-Pain management with IV acetaminophen and morphine as needed


-Antiemetic-zofran increased


-PPI


-DVT prophylaxis


-IV levothyroxine at 50 mcg


-CT enterography two abscess


-recommended Meckel scan as outpatient


-seen by surgery and endocrinology


-check stools and labs for anemia

## 2023-02-03 ENCOUNTER — HOSPITAL ENCOUNTER (OUTPATIENT)
Dept: HOSPITAL 74 - FASU | Age: 58
Discharge: HOME | End: 2023-02-03
Attending: PODIATRIST
Payer: COMMERCIAL

## 2023-02-03 VITALS — TEMPERATURE: 97.9 F

## 2023-02-03 VITALS — BODY MASS INDEX: 39.1 KG/M2

## 2023-02-03 VITALS — SYSTOLIC BLOOD PRESSURE: 125 MMHG | DIASTOLIC BLOOD PRESSURE: 74 MMHG | HEART RATE: 68 BPM | RESPIRATION RATE: 16 BRPM

## 2023-02-03 DIAGNOSIS — M20.41: ICD-10-CM

## 2023-02-03 DIAGNOSIS — M21.611: ICD-10-CM

## 2023-02-03 DIAGNOSIS — M20.11: Primary | ICD-10-CM

## 2023-02-03 PROCEDURE — 0SRP0JZ REPLACEMENT OF RIGHT TOE PHALANGEAL JOINT WITH SYNTHETIC SUBSTITUTE, OPEN APPROACH: ICD-10-PCS | Performed by: PODIATRIST

## 2023-02-03 PROCEDURE — 0QSN04Z REPOSITION RIGHT METATARSAL WITH INTERNAL FIXATION DEVICE, OPEN APPROACH: ICD-10-PCS | Performed by: PODIATRIST
